# Patient Record
Sex: FEMALE | Race: BLACK OR AFRICAN AMERICAN | Employment: UNEMPLOYED
[De-identification: names, ages, dates, MRNs, and addresses within clinical notes are randomized per-mention and may not be internally consistent; named-entity substitution may affect disease eponyms.]

---

## 2017-01-23 ENCOUNTER — TELEPHONE (OUTPATIENT)
Dept: CASE MANAGEMENT | Age: 77
End: 2017-01-23

## 2017-01-23 ENCOUNTER — OFFICE VISIT (OUTPATIENT)
Dept: INTERNAL MEDICINE CLINIC | Age: 77
End: 2017-01-23

## 2017-01-23 VITALS
WEIGHT: 97.2 LBS | SYSTOLIC BLOOD PRESSURE: 120 MMHG | BODY MASS INDEX: 17.22 KG/M2 | DIASTOLIC BLOOD PRESSURE: 56 MMHG | HEART RATE: 91 BPM | TEMPERATURE: 97.4 F | OXYGEN SATURATION: 94 %

## 2017-01-23 DIAGNOSIS — J18.9 PNEUMONIA OF LEFT LOWER LOBE DUE TO INFECTIOUS ORGANISM: Primary | ICD-10-CM

## 2017-01-23 DIAGNOSIS — E11.9 CONTROLLED TYPE 2 DIABETES MELLITUS WITHOUT COMPLICATION, WITHOUT LONG-TERM CURRENT USE OF INSULIN (HCC): ICD-10-CM

## 2017-01-23 DIAGNOSIS — R07.81 RIB PAIN ON LEFT SIDE: ICD-10-CM

## 2017-01-23 PROCEDURE — 99213 OFFICE O/P EST LOW 20 MIN: CPT | Performed by: NURSE PRACTITIONER

## 2017-01-23 RX ORDER — IBUPROFEN 400 MG/1
400 TABLET ORAL EVERY 6 HOURS PRN
Qty: 90 TABLET | Refills: 3 | Status: SHIPPED | OUTPATIENT
Start: 2017-01-23 | End: 2017-03-06 | Stop reason: SDUPTHER

## 2017-02-06 ENCOUNTER — OFFICE VISIT (OUTPATIENT)
Dept: PULMONOLOGY | Age: 77
End: 2017-02-06

## 2017-02-06 VITALS
OXYGEN SATURATION: 91 % | HEART RATE: 107 BPM | HEIGHT: 63 IN | RESPIRATION RATE: 18 BRPM | DIASTOLIC BLOOD PRESSURE: 76 MMHG | WEIGHT: 96 LBS | SYSTOLIC BLOOD PRESSURE: 132 MMHG | TEMPERATURE: 96.7 F | BODY MASS INDEX: 17.01 KG/M2

## 2017-02-06 DIAGNOSIS — J45.30 MILD PERSISTENT ASTHMA WITHOUT COMPLICATION: ICD-10-CM

## 2017-02-06 DIAGNOSIS — R93.89 ABNORMAL CT SCAN, CHEST: ICD-10-CM

## 2017-02-06 DIAGNOSIS — D80.1 HYPOGAMMAGLOBULINAEMIA, UNSPECIFIED: Primary | ICD-10-CM

## 2017-02-06 DIAGNOSIS — Z09 HOSPITAL DISCHARGE FOLLOW-UP: ICD-10-CM

## 2017-02-06 PROCEDURE — 99214 OFFICE O/P EST MOD 30 MIN: CPT | Performed by: INTERNAL MEDICINE

## 2017-02-06 RX ORDER — ALBUTEROL SULFATE 90 UG/1
2 AEROSOL, METERED RESPIRATORY (INHALATION) EVERY 6 HOURS PRN
Qty: 1 INHALER | Refills: 1 | Status: SHIPPED | OUTPATIENT
Start: 2017-02-06 | End: 2017-03-06 | Stop reason: SDUPTHER

## 2017-02-14 ENCOUNTER — HOSPITAL ENCOUNTER (OUTPATIENT)
Dept: CT IMAGING | Age: 77
Discharge: HOME | End: 2017-02-14
Attending: INTERNAL MEDICINE | Admitting: INTERNAL MEDICINE

## 2017-02-14 VITALS — HEART RATE: 97 BPM | OXYGEN SATURATION: 97 %

## 2017-02-14 DIAGNOSIS — R93.89 ABNORMAL RADIOLOGICAL FINDINGS IN SKIN AND SUBCUTANEOUS TISSUE: ICD-10-CM

## 2017-02-14 DIAGNOSIS — J45.30 MILD PERSISTENT ASTHMA, UNCOMPLICATED: ICD-10-CM

## 2017-02-14 RX ORDER — ALBUTEROL SULFATE 90 UG/1
4 AEROSOL, METERED RESPIRATORY (INHALATION) ONCE
Status: COMPLETED | OUTPATIENT
Start: 2017-02-14 | End: 2017-02-14

## 2017-02-14 RX ADMIN — ALBUTEROL SULFATE 4 PUFF: 90 AEROSOL, METERED RESPIRATORY (INHALATION) at 15:54

## 2017-02-23 PROBLEM — B99.9 RECURRENT INFECTIONS: Status: ACTIVE | Noted: 2017-02-23

## 2017-03-06 ENCOUNTER — OFFICE VISIT (OUTPATIENT)
Dept: INTERNAL MEDICINE CLINIC | Age: 77
End: 2017-03-06

## 2017-03-06 VITALS
SYSTOLIC BLOOD PRESSURE: 130 MMHG | WEIGHT: 103 LBS | HEART RATE: 86 BPM | OXYGEN SATURATION: 94 % | BODY MASS INDEX: 18.25 KG/M2 | DIASTOLIC BLOOD PRESSURE: 68 MMHG

## 2017-03-06 DIAGNOSIS — J44.9 CHRONIC OBSTRUCTIVE PULMONARY DISEASE, UNSPECIFIED COPD TYPE (HCC): ICD-10-CM

## 2017-03-06 DIAGNOSIS — Z79.4 CONTROLLED TYPE 2 DIABETES MELLITUS WITHOUT COMPLICATION, WITH LONG-TERM CURRENT USE OF INSULIN (HCC): Primary | ICD-10-CM

## 2017-03-06 DIAGNOSIS — M72.0 PALMAR FASCIITIS: ICD-10-CM

## 2017-03-06 DIAGNOSIS — E11.9 CONTROLLED TYPE 2 DIABETES MELLITUS WITHOUT COMPLICATION, WITH LONG-TERM CURRENT USE OF INSULIN (HCC): Primary | ICD-10-CM

## 2017-03-06 PROCEDURE — 99214 OFFICE O/P EST MOD 30 MIN: CPT | Performed by: NURSE PRACTITIONER

## 2017-03-06 RX ORDER — ALBUTEROL SULFATE 90 UG/1
2 AEROSOL, METERED RESPIRATORY (INHALATION) EVERY 6 HOURS PRN
Qty: 1 INHALER | Refills: 1 | Status: SHIPPED | OUTPATIENT
Start: 2017-03-06

## 2017-03-06 RX ORDER — IBUPROFEN 400 MG/1
TABLET ORAL
Qty: 90 TABLET | Refills: 3 | Status: ON HOLD | OUTPATIENT
Start: 2017-03-06 | End: 2019-08-14 | Stop reason: HOSPADM

## 2017-03-09 PROBLEM — Z29.8 PROPHYLACTIC IMMUNOTHERAPY: Status: ACTIVE | Noted: 2017-03-09

## 2017-03-31 ENCOUNTER — TELEPHONE (OUTPATIENT)
Dept: INTERNAL MEDICINE CLINIC | Age: 77
End: 2017-03-31

## 2017-06-14 ENCOUNTER — OFFICE VISIT (OUTPATIENT)
Dept: INTERNAL MEDICINE CLINIC | Age: 77
End: 2017-06-14

## 2017-06-14 VITALS
WEIGHT: 110 LBS | OXYGEN SATURATION: 98 % | DIASTOLIC BLOOD PRESSURE: 80 MMHG | SYSTOLIC BLOOD PRESSURE: 160 MMHG | BODY MASS INDEX: 20.12 KG/M2 | HEART RATE: 84 BPM

## 2017-06-14 DIAGNOSIS — E78.5 HYPERLIPIDEMIA, UNSPECIFIED HYPERLIPIDEMIA TYPE: ICD-10-CM

## 2017-06-14 DIAGNOSIS — E11.9 TYPE 2 DIABETES MELLITUS WITHOUT COMPLICATION, WITH LONG-TERM CURRENT USE OF INSULIN (HCC): Primary | Chronic | ICD-10-CM

## 2017-06-14 DIAGNOSIS — Z79.4 TYPE 2 DIABETES MELLITUS WITHOUT COMPLICATION, WITH LONG-TERM CURRENT USE OF INSULIN (HCC): Primary | Chronic | ICD-10-CM

## 2017-06-14 DIAGNOSIS — I10 ESSENTIAL HYPERTENSION: ICD-10-CM

## 2017-06-14 PROCEDURE — 99213 OFFICE O/P EST LOW 20 MIN: CPT | Performed by: NURSE PRACTITIONER

## 2017-06-14 RX ORDER — PRAVASTATIN SODIUM 40 MG
40 TABLET ORAL DAILY
Qty: 30 TABLET | Refills: 3 | Status: SHIPPED | OUTPATIENT
Start: 2017-06-14 | End: 2018-06-07 | Stop reason: SDUPTHER

## 2017-06-14 RX ORDER — CHLORTHALIDONE 25 MG/1
TABLET ORAL
Qty: 30 TABLET | Refills: 1 | Status: SHIPPED | OUTPATIENT
Start: 2017-06-14 | End: 2018-11-29 | Stop reason: SDUPTHER

## 2017-06-21 ENCOUNTER — TELEPHONE (OUTPATIENT)
Dept: INTERNAL MEDICINE CLINIC | Age: 77
End: 2017-06-21

## 2017-07-27 ENCOUNTER — OFFICE VISIT (OUTPATIENT)
Dept: INTERNAL MEDICINE CLINIC | Age: 77
End: 2017-07-27

## 2017-07-27 VITALS
DIASTOLIC BLOOD PRESSURE: 60 MMHG | BODY MASS INDEX: 20.85 KG/M2 | OXYGEN SATURATION: 96 % | WEIGHT: 114 LBS | HEART RATE: 101 BPM | SYSTOLIC BLOOD PRESSURE: 130 MMHG

## 2017-07-27 DIAGNOSIS — E11.9 TYPE 2 DIABETES MELLITUS WITHOUT COMPLICATION, WITH LONG-TERM CURRENT USE OF INSULIN (HCC): Primary | ICD-10-CM

## 2017-07-27 DIAGNOSIS — Z79.4 TYPE 2 DIABETES MELLITUS WITHOUT COMPLICATION, WITH LONG-TERM CURRENT USE OF INSULIN (HCC): Primary | ICD-10-CM

## 2017-07-27 PROCEDURE — 99213 OFFICE O/P EST LOW 20 MIN: CPT | Performed by: NURSE PRACTITIONER

## 2017-07-27 ASSESSMENT — PATIENT HEALTH QUESTIONNAIRE - PHQ9
SUM OF ALL RESPONSES TO PHQ9 QUESTIONS 1 & 2: 0
2. FEELING DOWN, DEPRESSED OR HOPELESS: 0
SUM OF ALL RESPONSES TO PHQ QUESTIONS 1-9: 0
1. LITTLE INTEREST OR PLEASURE IN DOING THINGS: 0

## 2017-08-03 ENCOUNTER — TELEPHONE (OUTPATIENT)
Dept: INTERNAL MEDICINE CLINIC | Age: 77
End: 2017-08-03

## 2017-08-31 ENCOUNTER — OFFICE VISIT (OUTPATIENT)
Dept: INTERNAL MEDICINE CLINIC | Age: 77
End: 2017-08-31

## 2017-08-31 VITALS
HEART RATE: 108 BPM | WEIGHT: 115 LBS | OXYGEN SATURATION: 93 % | DIASTOLIC BLOOD PRESSURE: 58 MMHG | SYSTOLIC BLOOD PRESSURE: 114 MMHG | BODY MASS INDEX: 21.16 KG/M2

## 2017-08-31 DIAGNOSIS — Z79.4 TYPE 2 DIABETES MELLITUS WITHOUT COMPLICATION, WITH LONG-TERM CURRENT USE OF INSULIN (HCC): Primary | ICD-10-CM

## 2017-08-31 DIAGNOSIS — E11.9 TYPE 2 DIABETES MELLITUS WITHOUT COMPLICATION, WITH LONG-TERM CURRENT USE OF INSULIN (HCC): Primary | ICD-10-CM

## 2017-08-31 PROCEDURE — 99213 OFFICE O/P EST LOW 20 MIN: CPT | Performed by: NURSE PRACTITIONER

## 2017-08-31 ASSESSMENT — ENCOUNTER SYMPTOMS: COUGH: 0

## 2017-11-06 ENCOUNTER — CARE COORDINATION (OUTPATIENT)
Dept: CARE COORDINATION | Age: 77
End: 2017-11-06

## 2017-11-13 ENCOUNTER — CARE COORDINATION (OUTPATIENT)
Dept: CARE COORDINATION | Age: 77
End: 2017-11-13

## 2017-11-29 ENCOUNTER — CARE COORDINATION (OUTPATIENT)
Dept: CARE COORDINATION | Age: 77
End: 2017-11-29

## 2017-12-07 ENCOUNTER — OFFICE VISIT (OUTPATIENT)
Dept: INTERNAL MEDICINE CLINIC | Age: 77
End: 2017-12-07

## 2017-12-07 VITALS — BODY MASS INDEX: 21.71 KG/M2 | SYSTOLIC BLOOD PRESSURE: 110 MMHG | WEIGHT: 118 LBS | DIASTOLIC BLOOD PRESSURE: 64 MMHG

## 2017-12-07 DIAGNOSIS — N95.9 MENOPAUSAL AND PERIMENOPAUSAL DISORDER: ICD-10-CM

## 2017-12-07 DIAGNOSIS — E11.9 DIABETES MELLITUS TYPE 2 IN NONOBESE (HCC): Primary | ICD-10-CM

## 2017-12-07 DIAGNOSIS — Z00.00 HEALTHCARE MAINTENANCE: ICD-10-CM

## 2017-12-07 PROCEDURE — 99213 OFFICE O/P EST LOW 20 MIN: CPT | Performed by: NURSE PRACTITIONER

## 2018-05-10 ENCOUNTER — TELEPHONE (OUTPATIENT)
Dept: INTERNAL MEDICINE CLINIC | Age: 78
End: 2018-05-10

## 2018-05-10 ENCOUNTER — CARE COORDINATION (OUTPATIENT)
Dept: CARE COORDINATION | Age: 78
End: 2018-05-10

## 2018-05-17 ENCOUNTER — CARE COORDINATION (OUTPATIENT)
Dept: CARE COORDINATION | Age: 78
End: 2018-05-17

## 2018-05-24 ENCOUNTER — CARE COORDINATION (OUTPATIENT)
Dept: INTERNAL MEDICINE CLINIC | Age: 78
End: 2018-05-24

## 2018-06-07 ENCOUNTER — OFFICE VISIT (OUTPATIENT)
Dept: INTERNAL MEDICINE CLINIC | Age: 78
End: 2018-06-07

## 2018-06-07 VITALS
BODY MASS INDEX: 21.73 KG/M2 | SYSTOLIC BLOOD PRESSURE: 120 MMHG | OXYGEN SATURATION: 95 % | HEART RATE: 110 BPM | WEIGHT: 115 LBS | DIASTOLIC BLOOD PRESSURE: 60 MMHG

## 2018-06-07 DIAGNOSIS — Z23 NEED FOR PNEUMOCOCCAL VACCINE: ICD-10-CM

## 2018-06-07 DIAGNOSIS — E78.5 HYPERLIPIDEMIA, UNSPECIFIED HYPERLIPIDEMIA TYPE: ICD-10-CM

## 2018-06-07 LAB — HBA1C MFR BLD: 14 %

## 2018-06-07 PROCEDURE — G0009 ADMIN PNEUMOCOCCAL VACCINE: HCPCS | Performed by: NURSE PRACTITIONER

## 2018-06-07 PROCEDURE — 90670 PCV13 VACCINE IM: CPT | Performed by: NURSE PRACTITIONER

## 2018-06-07 PROCEDURE — 83036 HEMOGLOBIN GLYCOSYLATED A1C: CPT | Performed by: NURSE PRACTITIONER

## 2018-06-07 PROCEDURE — 99214 OFFICE O/P EST MOD 30 MIN: CPT | Performed by: NURSE PRACTITIONER

## 2018-06-07 RX ORDER — PRAVASTATIN SODIUM 40 MG
40 TABLET ORAL DAILY
Qty: 30 TABLET | Refills: 3 | Status: SHIPPED | OUTPATIENT
Start: 2018-06-07 | End: 2018-11-29 | Stop reason: SDUPTHER

## 2018-06-07 ASSESSMENT — ENCOUNTER SYMPTOMS: SHORTNESS OF BREATH: 1

## 2018-07-12 ENCOUNTER — OFFICE VISIT (OUTPATIENT)
Dept: INTERNAL MEDICINE CLINIC | Age: 78
End: 2018-07-12

## 2018-07-12 VITALS
HEART RATE: 89 BPM | SYSTOLIC BLOOD PRESSURE: 128 MMHG | BODY MASS INDEX: 21.54 KG/M2 | WEIGHT: 114 LBS | OXYGEN SATURATION: 98 % | DIASTOLIC BLOOD PRESSURE: 80 MMHG

## 2018-07-12 PROCEDURE — 99213 OFFICE O/P EST LOW 20 MIN: CPT | Performed by: NURSE PRACTITIONER

## 2018-08-28 ENCOUNTER — TELEPHONE (OUTPATIENT)
Dept: INTERNAL MEDICINE CLINIC | Age: 78
End: 2018-08-28

## 2018-09-06 ENCOUNTER — OFFICE VISIT (OUTPATIENT)
Dept: INTERNAL MEDICINE CLINIC | Age: 78
End: 2018-09-06

## 2018-09-06 VITALS
WEIGHT: 117 LBS | BODY MASS INDEX: 22.11 KG/M2 | TEMPERATURE: 98 F | DIASTOLIC BLOOD PRESSURE: 58 MMHG | OXYGEN SATURATION: 97 % | SYSTOLIC BLOOD PRESSURE: 98 MMHG | HEART RATE: 97 BPM

## 2018-09-06 DIAGNOSIS — E11.8 TYPE 2 DIABETES MELLITUS WITH COMPLICATION, WITHOUT LONG-TERM CURRENT USE OF INSULIN (HCC): Primary | ICD-10-CM

## 2018-09-06 LAB — HBA1C MFR BLD: 13.7 %

## 2018-09-06 PROCEDURE — 83036 HEMOGLOBIN GLYCOSYLATED A1C: CPT | Performed by: NURSE PRACTITIONER

## 2018-09-06 PROCEDURE — 99213 OFFICE O/P EST LOW 20 MIN: CPT | Performed by: NURSE PRACTITIONER

## 2018-09-06 RX ORDER — PEN NEEDLE, DIABETIC 30 GX5/16"
1 NEEDLE, DISPOSABLE MISCELLANEOUS DAILY
Qty: 100 EACH | Refills: 3 | Status: SHIPPED | OUTPATIENT
Start: 2018-09-06

## 2018-09-06 ASSESSMENT — PATIENT HEALTH QUESTIONNAIRE - PHQ9
1. LITTLE INTEREST OR PLEASURE IN DOING THINGS: 0
2. FEELING DOWN, DEPRESSED OR HOPELESS: 0
SUM OF ALL RESPONSES TO PHQ QUESTIONS 1-9: 0
SUM OF ALL RESPONSES TO PHQ QUESTIONS 1-9: 0
SUM OF ALL RESPONSES TO PHQ9 QUESTIONS 1 & 2: 0

## 2018-09-06 ASSESSMENT — ENCOUNTER SYMPTOMS: NAUSEA: 0

## 2018-09-06 NOTE — PROGRESS NOTES
Subjective:      Patient ID: Inessa Ferrara is a 66 y.o. female. Diabetes   She presents for her follow-up diabetic visit. She has type 2 diabetes mellitus. No MedicAlert identification noted. Her disease course has been improving. There are no hypoglycemic associated symptoms. Associated symptoms include fatigue. There are no hypoglycemic complications. Symptoms are improving. There are no diabetic complications. Risk factors for coronary artery disease include family history. Current diabetic treatment includes insulin injections and oral agent (monotherapy). She is compliant with treatment most of the time. Her breakfast blood glucose range is generally 130-140 mg/dl. Her dinner blood glucose range is generally 70-90 mg/dl. Fatigue   Associated symptoms include fatigue. Pertinent negatives include no nausea. Review of Systems   Constitutional: Positive for appetite change (decreased) and fatigue. Gastrointestinal: Negative for nausea. All other systems reviewed and are negative. Wt Readings from Last 3 Encounters:   09/06/18 117 lb (53.1 kg)   07/12/18 114 lb (51.7 kg)   06/07/18 115 lb (52.2 kg)     BP Readings from Last 3 Encounters:   09/06/18 (!) 98/58   07/12/18 128/80   06/07/18 120/60     Past Medical History:   Diagnosis Date    CLL (chronic lymphocytic leukemia) (HCC)     COPD, severe (HCC)     Diabetes type 2, uncontrolled ([facility] [address]     Disease of blood and blood forming organ     H/O medication noncompliance     Does not take her insulin regularly    Heart murmur     Hyperlipidemia     Hypertension     Influenza A 2/13/16    Other disorders of kidney and ureter in diseases classified elsewhere     Pneumonia      Objective:   Physical Exam   Constitutional: She is oriented to person, place, and time. She appears well-developed and well-nourished. Cardiovascular: Normal rate and regular rhythm.     Pulmonary/Chest: Effort normal and breath sounds normal.   Neurological: She is alert and oriented to person, place, and time. Skin: Skin is warm and dry. Psychiatric: She has a normal mood and affect. Her speech is normal.     Diabetes Mellitus Type 2: Current symptoms/problems include none. Medication compliance:  compliant most of the time  Diabetic diet compliance:  noncompliant: Enjoys her peanut butter Zachary cup,  Weight trend: stable  Current exercise: no regular exercise  Barriers: lack of motivation and overwhelmed by complexity of regimen    Home blood sugar records: Tested erratically  Any episodes of hypoglycemia? no  Eye exam current (within one year): no   reports that she quit smoking about 31 years ago. Her smoking use included Cigarettes. She has a 6.00 pack-year smoking history. She has never used smokeless tobacco.   Daily Aspirin?  Yes    Lab Results   Component Value Date    LABA1C 13.7 09/06/2018    LABA1C 14 06/07/2018    LABA1C 8.0 12/23/2016     Lab Results   Component Value Date    LABMICR Not Indicated 01/03/2017    CREATININE 1.5 (H) 03/19/2018     Lab Results   Component Value Date    ALT 12 03/19/2018    AST 21 03/19/2018     Lab Results   Component Value Date    CHOL 182 12/22/2016    TRIG 82 12/22/2016    HDL 70 (H) 12/22/2016    LDLCALC 96 12/22/2016        Assessment:      Diabetes type 2: Uncontrolled      Plan:     Increase water to 3.5 glasses a day  Decrease going barefoot in yard  Discontinue Humalog  Please start new medication today and then nightly please check glucose daily          Nancy Ferrell, APRN - CNP

## 2018-09-20 ENCOUNTER — OFFICE VISIT (OUTPATIENT)
Dept: INTERNAL MEDICINE CLINIC | Age: 78
End: 2018-09-20

## 2018-09-20 VITALS
WEIGHT: 120 LBS | HEART RATE: 88 BPM | SYSTOLIC BLOOD PRESSURE: 126 MMHG | DIASTOLIC BLOOD PRESSURE: 82 MMHG | OXYGEN SATURATION: 98 % | BODY MASS INDEX: 22.67 KG/M2

## 2018-09-20 DIAGNOSIS — E11.8 TYPE 2 DIABETES MELLITUS WITH COMPLICATION, WITHOUT LONG-TERM CURRENT USE OF INSULIN (HCC): Primary | ICD-10-CM

## 2018-09-20 PROCEDURE — 99213 OFFICE O/P EST LOW 20 MIN: CPT | Performed by: NURSE PRACTITIONER

## 2018-09-20 ASSESSMENT — ENCOUNTER SYMPTOMS
SHORTNESS OF BREATH: 0
VISUAL CHANGE: 0
CHEST TIGHTNESS: 0
RESPIRATORY NEGATIVE: 1
ALLERGIC/IMMUNOLOGIC NEGATIVE: 1
BLURRED VISION: 0
EYES NEGATIVE: 1

## 2018-09-20 NOTE — PROGRESS NOTES
Subjective:      Patient ID: Inessa Ferrara is a 66 y.o. female. Chief Complaint   Patient presents with    Diabetes     F/U-Diabetes     Diabetes   She presents for her follow-up diabetic visit. She has type 2 diabetes mellitus. No MedicAlert identification noted. Her disease course has been improving. There are no hypoglycemic associated symptoms. Pertinent negatives for hypoglycemia include no dizziness, headaches or nervousness/anxiousness. Pertinent negatives for diabetes include no blurred vision, no chest pain, no fatigue, no foot paresthesias, no foot ulcerations, no polydipsia, no polyphagia, no polyuria, no visual change, no weakness and no weight loss. There are no hypoglycemic complications. Symptoms are stable. Pertinent negatives for diabetic complications include no nephropathy or retinopathy. (Tingling in fingers on occasion.) Risk factors for coronary artery disease include post-menopausal, diabetes mellitus and family history. Current diabetic treatment includes insulin injections and oral agent (monotherapy). She is compliant with treatment all of the time. Her weight is stable. She is following a generally healthy (\"Normal diet\") diet. Diabetic meal planning: Occasionally gets candy. She has not had a previous visit with a dietitian. Exercise: Plays with grandchildre. No exercise otherwise. She monitors blood glucose at home 1-2 x per day. Blood glucose monitoring compliance is excellent. Home blood sugar record trend: Home readings range between 140-170mg/dL. An ACE inhibitor/angiotensin II receptor blocker is being taken. She does not see a podiatrist.Eye exam is not current (Had eye exam last year). Smoking History: not a current smoker. Quit smoking 31 years ago. Patient states she has never had a foot exam. Does not do self foot exams at home. No other concerns at this time.      Review of Systems   Constitutional: Negative for activity change, appetite change, fatigue and weight

## 2018-09-20 NOTE — PATIENT INSTRUCTIONS
Continue taking medication as prescribed  Remember to watch her diet any food portions  Do not go barefoot  Continue to drink water

## 2018-10-11 ENCOUNTER — OFFICE VISIT (OUTPATIENT)
Dept: INTERNAL MEDICINE CLINIC | Age: 78
End: 2018-10-11
Payer: MEDICARE

## 2018-10-11 VITALS
HEART RATE: 92 BPM | OXYGEN SATURATION: 95 % | WEIGHT: 123 LBS | DIASTOLIC BLOOD PRESSURE: 70 MMHG | SYSTOLIC BLOOD PRESSURE: 144 MMHG | BODY MASS INDEX: 23.24 KG/M2

## 2018-10-11 DIAGNOSIS — Z00.00 HEALTHCARE MAINTENANCE: Primary | ICD-10-CM

## 2018-10-11 DIAGNOSIS — E11.8 TYPE 2 DIABETES MELLITUS WITH COMPLICATION, WITHOUT LONG-TERM CURRENT USE OF INSULIN (HCC): ICD-10-CM

## 2018-10-11 DIAGNOSIS — Z78.0 ASYMPTOMATIC MENOPAUSAL STATE: ICD-10-CM

## 2018-10-11 PROCEDURE — 99213 OFFICE O/P EST LOW 20 MIN: CPT | Performed by: NURSE PRACTITIONER

## 2018-10-11 RX ORDER — PEN NEEDLE, DIABETIC 31 G X1/4"
1 NEEDLE, DISPOSABLE MISCELLANEOUS DAILY
Qty: 100 EACH | Refills: 2 | Status: SHIPPED | OUTPATIENT
Start: 2018-10-11

## 2018-10-11 RX ORDER — ERGOCALCIFEROL (VITAMIN D2) 1250 MCG
50000 CAPSULE ORAL WEEKLY
Qty: 30 CAPSULE | Refills: 0 | Status: SHIPPED | OUTPATIENT
Start: 2018-10-11 | End: 2018-11-29 | Stop reason: SDUPTHER

## 2018-10-11 NOTE — PROGRESS NOTES
Subjective:      Patient ID: Charmayne Silva is a 66 y.o. female. Diabetes   She presents for her follow-up diabetic visit. She has type 2 diabetes mellitus. No MedicAlert identification noted. Her disease course has been fluctuating. There are no hypoglycemic associated symptoms.        Review of Systems  BP Readings from Last 3 Encounters:   10/11/18 (!) 140/70   09/20/18 126/82   09/06/18 (!) 98/58     Wt Readings from Last 3 Encounters:   10/11/18 123 lb (55.8 kg)   09/20/18 120 lb (54.4 kg)   09/06/18 117 lb (53.1 kg)     Past Medical History:   Diagnosis Date    CLL (chronic lymphocytic leukemia) (HCC)     [facility] [address]     Diabetes type 2, uncontrolled ([facility] [address]     Disease of blood and blood forming organ     H/O medication noncompliance     Does not take her insulin regularly    Heart murmur     Hyperlipidemia     Hypertension     Influenza A 2/13/16    Other disorders of kidney and ureter in diseases classified elsewhere     Pneumonia      Current Outpatient Prescriptions on File Prior to Visit   Medication Sig Dispense Refill    Insulin Pen Needle (PEN NEEDLES 5/16\") 30G X 8 MM MISC 1 each by Does not apply route daily 100 each 3    pravastatin (PRAVACHOL) 40 MG tablet Take 1 tablet by mouth daily 30 tablet 3    linagliptin (TRADJENTA) 5 MG tablet Take 1 tablet by mouth daily 42 tablet 0    chlorthalidone (HYGROTON) 25 MG tablet One half tablet daily 30 tablet 1    ibuprofen (ADVIL;MOTRIN) 400 MG tablet One tab every 6 hours as needed 90 tablet 3    albuterol sulfate  (90 BASE) MCG/ACT inhaler Inhale 2 puffs into the lungs every 6 hours as needed for Wheezing 1 Inhaler 1    mometasone-formoterol (DULERA) 200-5 MCG/ACT inhaler Inhale 2 puffs into the lungs 2 times daily 1 Inhaler 2    Lancet Devices (EASY MINI EJECT LANCING DEVICE) MISC       sitaGLIPtin (JANUVIA) 50 MG tablet Take 1 tablet by mouth daily 30 tablet 1    calcium-vitamin D (OSCAL-500) 500-200 MG-UNIT per tablet

## 2018-10-15 ENCOUNTER — TELEPHONE (OUTPATIENT)
Dept: INTERNAL MEDICINE CLINIC | Age: 78
End: 2018-10-15

## 2018-10-16 ENCOUNTER — HOSPITAL ENCOUNTER (OUTPATIENT)
Dept: GENERAL RADIOLOGY | Age: 78
Discharge: HOME | End: 2018-10-16
Payer: MEDICARE

## 2018-10-16 DIAGNOSIS — Z00.00 HEALTHCARE MAINTENANCE: ICD-10-CM

## 2018-10-16 DIAGNOSIS — Z78.0 ASYMPTOMATIC MENOPAUSAL STATE: ICD-10-CM

## 2018-10-16 PROCEDURE — 77080 DXA BONE DENSITY AXIAL: CPT

## 2018-10-24 ENCOUNTER — TELEPHONE (OUTPATIENT)
Dept: PULMONOLOGY | Age: 78
End: 2018-10-24

## 2018-11-29 ENCOUNTER — OFFICE VISIT (OUTPATIENT)
Dept: INTERNAL MEDICINE CLINIC | Age: 78
End: 2018-11-29
Payer: MEDICARE

## 2018-11-29 VITALS
WEIGHT: 128 LBS | BODY MASS INDEX: 24.19 KG/M2 | DIASTOLIC BLOOD PRESSURE: 60 MMHG | SYSTOLIC BLOOD PRESSURE: 140 MMHG | OXYGEN SATURATION: 95 % | HEART RATE: 89 BPM

## 2018-11-29 DIAGNOSIS — E78.5 HYPERLIPIDEMIA, UNSPECIFIED HYPERLIPIDEMIA TYPE: ICD-10-CM

## 2018-11-29 DIAGNOSIS — Z23 FLU VACCINE NEED: ICD-10-CM

## 2018-11-29 DIAGNOSIS — E11.65 UNCONTROLLED TYPE 2 DIABETES MELLITUS WITH HYPERGLYCEMIA (HCC): Primary | ICD-10-CM

## 2018-11-29 DIAGNOSIS — I10 ESSENTIAL HYPERTENSION: ICD-10-CM

## 2018-11-29 LAB — GLUCOSE BLD-MCNC: 121 MG/DL

## 2018-11-29 PROCEDURE — G0008 ADMIN INFLUENZA VIRUS VAC: HCPCS | Performed by: NURSE PRACTITIONER

## 2018-11-29 PROCEDURE — 82962 GLUCOSE BLOOD TEST: CPT | Performed by: NURSE PRACTITIONER

## 2018-11-29 PROCEDURE — 99213 OFFICE O/P EST LOW 20 MIN: CPT | Performed by: NURSE PRACTITIONER

## 2018-11-29 PROCEDURE — 90662 IIV NO PRSV INCREASED AG IM: CPT | Performed by: NURSE PRACTITIONER

## 2018-11-29 RX ORDER — ERGOCALCIFEROL (VITAMIN D2) 1250 MCG
50000 CAPSULE ORAL WEEKLY
Qty: 30 CAPSULE | Refills: 0 | Status: SHIPPED | OUTPATIENT
Start: 2018-11-29

## 2018-11-29 RX ORDER — CHLORTHALIDONE 25 MG/1
TABLET ORAL
Qty: 30 TABLET | Refills: 1 | Status: ON HOLD | OUTPATIENT
Start: 2018-11-29 | End: 2019-08-14 | Stop reason: HOSPADM

## 2018-11-29 RX ORDER — PRAVASTATIN SODIUM 40 MG
40 TABLET ORAL DAILY
Qty: 30 TABLET | Refills: 3 | Status: SHIPPED | OUTPATIENT
Start: 2018-11-29

## 2018-11-29 NOTE — PROGRESS NOTES
Subjective:      Patient ID: Bari Yan is a 66 y.o. female. Presents today for follow up on diabetes. States she is eating better, is feeling better, and apparently had one episode of hypoglycemia when she fell to eat breakfast.        Review of Systems  Wt Readings from Last 3 Encounters:   11/29/18 128 lb (58.1 kg)   10/11/18 123 lb (55.8 kg)   09/20/18 120 lb (54.4 kg)     BP Readings from Last 3 Encounters:   11/29/18 (!) 140/60   10/11/18 (!) 144/70   09/20/18 126/82     Diabetes Mellitus Type 2: Current symptoms/problems include none. Medication compliance:  compliant all of the time  Diabetic diet compliance:  compliant most of the time,  Weight trend: fluctuating  Current exercise: no regular exercise  Barriers: none    Home blood sugar records: postprandial range:   Any episodes of hypoglycemia? yes - once  Eye exam current (within one year): unknown   reports that she quit smoking about 31 years ago. Her smoking use included Cigarettes. She has a 6.00 pack-year smoking history. She has never used smokeless tobacco.   Daily Aspirin? Yes    Lab Results   Component Value Date    LABA1C 13.7 09/06/2018    LABA1C 14 06/07/2018    LABA1C 8.0 12/23/2016     Lab Results   Component Value Date    LABMICR Not Indicated 01/03/2017    CREATININE 1.5 (H) 03/19/2018     Lab Results   Component Value Date    ALT 12 03/19/2018    AST 21 03/19/2018     Lab Results   Component Value Date    CHOL 182 12/22/2016    TRIG 82 12/22/2016    HDL 70 (H) 12/22/2016    LDLCALC 96 12/22/2016        Objective:   Physical Exam   Constitutional: She is oriented to person, place, and time. She appears well-developed and well-nourished. Neck: Normal range of motion. Cardiovascular: Normal rate, regular rhythm and normal heart sounds. Pulmonary/Chest: Effort normal and breath sounds normal.   Neurological: She is alert and oriented to person, place, and time. Skin: Skin is warm and dry.    Psychiatric: She has a normal

## 2018-11-29 NOTE — PATIENT INSTRUCTIONS
Keep peanut butter handy for low blood sugars  Eat small fruit at fruti at night if you get hungry  Continue to drink plenty of water

## 2019-01-29 ENCOUNTER — OFFICE VISIT (OUTPATIENT)
Dept: INTERNAL MEDICINE CLINIC | Age: 79
End: 2019-01-29
Payer: MEDICARE

## 2019-01-29 VITALS — DIASTOLIC BLOOD PRESSURE: 68 MMHG | WEIGHT: 127 LBS | BODY MASS INDEX: 24 KG/M2 | SYSTOLIC BLOOD PRESSURE: 136 MMHG

## 2019-01-29 DIAGNOSIS — E11.9 TYPE 2 DIABETES MELLITUS WITHOUT COMPLICATION, WITH LONG-TERM CURRENT USE OF INSULIN (HCC): Primary | ICD-10-CM

## 2019-01-29 DIAGNOSIS — Z79.4 TYPE 2 DIABETES MELLITUS WITHOUT COMPLICATION, WITH LONG-TERM CURRENT USE OF INSULIN (HCC): Primary | ICD-10-CM

## 2019-01-29 LAB
ESTIMATED AVERAGE GLUCOSE: 283.4 MG/DL
HBA1C MFR BLD: 11.5 %

## 2019-01-29 PROCEDURE — 99213 OFFICE O/P EST LOW 20 MIN: CPT | Performed by: NURSE PRACTITIONER

## 2019-01-29 ASSESSMENT — ENCOUNTER SYMPTOMS
ALLERGIC/IMMUNOLOGIC NEGATIVE: 1
RESPIRATORY NEGATIVE: 1
EYES NEGATIVE: 1

## 2019-03-17 ENCOUNTER — APPOINTMENT (OUTPATIENT)
Dept: GENERAL RADIOLOGY | Age: 79
End: 2019-03-17
Payer: MEDICARE

## 2019-03-17 ENCOUNTER — HOSPITAL ENCOUNTER (EMERGENCY)
Age: 79
Discharge: HOME | End: 2019-03-18
Attending: EMERGENCY MEDICINE
Payer: MEDICARE

## 2019-03-17 DIAGNOSIS — N39.0 URINARY TRACT INFECTION IN FEMALE: Primary | ICD-10-CM

## 2019-03-17 DIAGNOSIS — Z85.6 PERSONAL HISTORY OF CLL (CHRONIC LYMPHOCYTIC LEUKEMIA): ICD-10-CM

## 2019-03-17 LAB
A/G RATIO: 1.1 (ref 1.1–2.2)
ALBUMIN SERPL-MCNC: 4 G/DL (ref 3.4–5)
ALP BLD-CCNC: 131 U/L (ref 40–129)
ALT SERPL-CCNC: 8 U/L (ref 10–40)
ANION GAP SERPL CALCULATED.3IONS-SCNC: 16 MMOL/L (ref 3–16)
AST SERPL-CCNC: 9 U/L (ref 15–37)
BACTERIA: ABNORMAL /HPF
BASOPHILS ABSOLUTE: 0 K/UL (ref 0–0.2)
BASOPHILS RELATIVE PERCENT: 0.3 %
BILIRUB SERPL-MCNC: 0.4 MG/DL (ref 0–1)
BILIRUBIN URINE: NEGATIVE
BLOOD, URINE: ABNORMAL
BUN BLDV-MCNC: 54 MG/DL (ref 7–20)
CALCIUM SERPL-MCNC: 9.3 MG/DL (ref 8.3–10.6)
CHLORIDE BLD-SCNC: 97 MMOL/L (ref 99–110)
CLARITY: ABNORMAL
CO2: 20 MMOL/L (ref 21–32)
COLOR: YELLOW
CREAT SERPL-MCNC: 1.6 MG/DL (ref 0.6–1.2)
EOSINOPHILS ABSOLUTE: 0.1 K/UL (ref 0–0.6)
EOSINOPHILS RELATIVE PERCENT: 0.7 %
EPITHELIAL CELLS, UA: 2 /HPF (ref 0–5)
GFR AFRICAN AMERICAN: 38
GFR NON-AFRICAN AMERICAN: 31
GLOBULIN: 3.7 G/DL
GLUCOSE BLD-MCNC: 309 MG/DL (ref 70–99)
GLUCOSE URINE: NEGATIVE MG/DL
HCT VFR BLD CALC: 40.4 % (ref 36–48)
HEMOGLOBIN: 13.7 G/DL (ref 12–16)
HYALINE CASTS: 0 /LPF (ref 0–8)
KETONES, URINE: ABNORMAL MG/DL
LACTIC ACID, SEPSIS: 1.5 MMOL/L (ref 0.4–1.9)
LEUKOCYTE ESTERASE, URINE: ABNORMAL
LYMPHOCYTES ABSOLUTE: 1.7 K/UL (ref 1–5.1)
LYMPHOCYTES RELATIVE PERCENT: 20.8 %
MCH RBC QN AUTO: 29.7 PG (ref 26–34)
MCHC RBC AUTO-ENTMCNC: 33.8 G/DL (ref 31–36)
MCV RBC AUTO: 87.9 FL (ref 80–100)
MICROSCOPIC EXAMINATION: YES
MONOCYTES ABSOLUTE: 1 K/UL (ref 0–1.3)
MONOCYTES RELATIVE PERCENT: 12.7 %
NEUTROPHILS ABSOLUTE: 5.3 K/UL (ref 1.7–7.7)
NEUTROPHILS RELATIVE PERCENT: 65.5 %
NITRITE, URINE: NEGATIVE
PDW BLD-RTO: 13.4 % (ref 12.4–15.4)
PH UA: 5.5 (ref 5–8)
PLATELET # BLD: 303 K/UL (ref 135–450)
PMV BLD AUTO: 8.6 FL (ref 5–10.5)
POTASSIUM SERPL-SCNC: 4.5 MMOL/L (ref 3.5–5.1)
PROTEIN UA: 30 MG/DL
RBC # BLD: 4.6 M/UL (ref 4–5.2)
RBC UA: 9 /HPF (ref 0–4)
SODIUM BLD-SCNC: 133 MMOL/L (ref 136–145)
SPECIFIC GRAVITY UA: 1.01 (ref 1–1.03)
TOTAL PROTEIN: 7.7 G/DL (ref 6.4–8.2)
URINE REFLEX TO CULTURE: YES
URINE TYPE: ABNORMAL
UROBILINOGEN, URINE: 1 E.U./DL
WBC # BLD: 8.1 K/UL (ref 4–11)
WBC UA: 625 /HPF (ref 0–5)

## 2019-03-17 PROCEDURE — 96361 HYDRATE IV INFUSION ADD-ON: CPT

## 2019-03-17 PROCEDURE — 71046 X-RAY EXAM CHEST 2 VIEWS: CPT

## 2019-03-17 PROCEDURE — 80053 COMPREHEN METABOLIC PANEL: CPT

## 2019-03-17 PROCEDURE — 87040 BLOOD CULTURE FOR BACTERIA: CPT

## 2019-03-17 PROCEDURE — 99283 EMERGENCY DEPT VISIT LOW MDM: CPT

## 2019-03-17 PROCEDURE — 96374 THER/PROPH/DIAG INJ IV PUSH: CPT

## 2019-03-17 PROCEDURE — 81001 URINALYSIS AUTO W/SCOPE: CPT

## 2019-03-17 PROCEDURE — 2580000003 HC RX 258: Performed by: NURSE PRACTITIONER

## 2019-03-17 PROCEDURE — 87086 URINE CULTURE/COLONY COUNT: CPT

## 2019-03-17 PROCEDURE — 87077 CULTURE AEROBIC IDENTIFY: CPT

## 2019-03-17 PROCEDURE — 85025 COMPLETE CBC W/AUTO DIFF WBC: CPT

## 2019-03-17 PROCEDURE — 87186 SC STD MICRODIL/AGAR DIL: CPT

## 2019-03-17 PROCEDURE — 6360000002 HC RX W HCPCS: Performed by: NURSE PRACTITIONER

## 2019-03-17 PROCEDURE — 83605 ASSAY OF LACTIC ACID: CPT

## 2019-03-17 RX ORDER — 0.9 % SODIUM CHLORIDE 0.9 %
1000 INTRAVENOUS SOLUTION INTRAVENOUS ONCE
Status: COMPLETED | OUTPATIENT
Start: 2019-03-17 | End: 2019-03-18

## 2019-03-17 RX ADMIN — Medication 1 G: at 23:19

## 2019-03-17 RX ADMIN — SODIUM CHLORIDE 1000 ML: 9 INJECTION, SOLUTION INTRAVENOUS at 23:19

## 2019-03-18 VITALS
BODY MASS INDEX: 23.98 KG/M2 | RESPIRATION RATE: 18 BRPM | TEMPERATURE: 98 F | HEART RATE: 99 BPM | HEIGHT: 61 IN | SYSTOLIC BLOOD PRESSURE: 150 MMHG | WEIGHT: 127 LBS | DIASTOLIC BLOOD PRESSURE: 68 MMHG | OXYGEN SATURATION: 99 %

## 2019-03-18 RX ORDER — CEPHALEXIN 500 MG/1
500 CAPSULE ORAL 2 TIMES DAILY
Qty: 20 CAPSULE | Refills: 0 | Status: SHIPPED | OUTPATIENT
Start: 2019-03-18 | End: 2019-03-28

## 2019-03-18 ASSESSMENT — ENCOUNTER SYMPTOMS
ABDOMINAL PAIN: 0
CHEST TIGHTNESS: 0
SHORTNESS OF BREATH: 0
VOMITING: 0
NAUSEA: 0
DIARRHEA: 0

## 2019-03-19 LAB
ORGANISM: ABNORMAL
URINE CULTURE, ROUTINE: ABNORMAL
URINE CULTURE, ROUTINE: ABNORMAL

## 2019-03-21 ENCOUNTER — HOSPITAL ENCOUNTER (EMERGENCY)
Age: 79
Discharge: HOME | End: 2019-03-21
Attending: EMERGENCY MEDICINE
Payer: MEDICARE

## 2019-03-21 ENCOUNTER — OFFICE VISIT (OUTPATIENT)
Dept: INTERNAL MEDICINE CLINIC | Age: 79
End: 2019-03-21
Payer: MEDICARE

## 2019-03-21 VITALS
DIASTOLIC BLOOD PRESSURE: 54 MMHG | TEMPERATURE: 97.5 F | BODY MASS INDEX: 22.11 KG/M2 | HEART RATE: 95 BPM | WEIGHT: 117 LBS | OXYGEN SATURATION: 97 % | SYSTOLIC BLOOD PRESSURE: 98 MMHG

## 2019-03-21 VITALS
WEIGHT: 117 LBS | SYSTOLIC BLOOD PRESSURE: 193 MMHG | OXYGEN SATURATION: 91 % | BODY MASS INDEX: 20.73 KG/M2 | HEART RATE: 86 BPM | HEIGHT: 63 IN | TEMPERATURE: 98.8 F | DIASTOLIC BLOOD PRESSURE: 83 MMHG | RESPIRATION RATE: 16 BRPM

## 2019-03-21 DIAGNOSIS — I10 ESSENTIAL HYPERTENSION: ICD-10-CM

## 2019-03-21 DIAGNOSIS — E11.8 TYPE 2 DIABETES MELLITUS WITH COMPLICATION, WITH LONG-TERM CURRENT USE OF INSULIN (HCC): ICD-10-CM

## 2019-03-21 DIAGNOSIS — N30.00 ACUTE CYSTITIS WITHOUT HEMATURIA: Primary | ICD-10-CM

## 2019-03-21 DIAGNOSIS — Z79.4 TYPE 2 DIABETES MELLITUS WITH COMPLICATION, WITH LONG-TERM CURRENT USE OF INSULIN (HCC): ICD-10-CM

## 2019-03-21 DIAGNOSIS — I95.9 HYPOTENSION, UNSPECIFIED HYPOTENSION TYPE: Primary | ICD-10-CM

## 2019-03-21 DIAGNOSIS — R63.4 WEIGHT LOSS: ICD-10-CM

## 2019-03-21 DIAGNOSIS — R63.4 WEIGHT LOSS, UNINTENTIONAL: ICD-10-CM

## 2019-03-21 DIAGNOSIS — R53.83 OTHER FATIGUE: ICD-10-CM

## 2019-03-21 LAB
A/G RATIO: 1.1 (ref 1.1–2.2)
ALBUMIN SERPL-MCNC: 3.6 G/DL (ref 3.4–5)
ALP BLD-CCNC: 106 U/L (ref 40–129)
ALT SERPL-CCNC: 9 U/L (ref 10–40)
ANION GAP SERPL CALCULATED.3IONS-SCNC: 13 MMOL/L (ref 3–16)
AST SERPL-CCNC: 20 U/L (ref 15–37)
BASOPHILS ABSOLUTE: 0 K/UL (ref 0–0.2)
BASOPHILS RELATIVE PERCENT: 0.2 %
BILIRUB SERPL-MCNC: 0.3 MG/DL (ref 0–1)
BILIRUBIN URINE: NEGATIVE
BLOOD, URINE: ABNORMAL
BUN BLDV-MCNC: 22 MG/DL (ref 7–20)
CALCIUM SERPL-MCNC: 8.7 MG/DL (ref 8.3–10.6)
CHLORIDE BLD-SCNC: 97 MMOL/L (ref 99–110)
CLARITY: ABNORMAL
CO2: 22 MMOL/L (ref 21–32)
COLOR: YELLOW
CREAT SERPL-MCNC: 1.4 MG/DL (ref 0.6–1.2)
EKG ATRIAL RATE: 85 BPM
EKG DIAGNOSIS: NORMAL
EKG P AXIS: 48 DEGREES
EKG P-R INTERVAL: 134 MS
EKG Q-T INTERVAL: 368 MS
EKG QRS DURATION: 74 MS
EKG QTC CALCULATION (BAZETT): 437 MS
EKG R AXIS: -3 DEGREES
EKG T AXIS: 24 DEGREES
EKG VENTRICULAR RATE: 85 BPM
EOSINOPHILS ABSOLUTE: 0 K/UL (ref 0–0.6)
EOSINOPHILS RELATIVE PERCENT: 0.2 %
EPITHELIAL CELLS, UA: 3 /HPF (ref 0–5)
GFR AFRICAN AMERICAN: 44
GFR NON-AFRICAN AMERICAN: 36
GLOBULIN: 3.3 G/DL
GLUCOSE BLD-MCNC: 266 MG/DL (ref 70–99)
GLUCOSE BLD-MCNC: 281 MG/DL
GLUCOSE URINE: 100 MG/DL
HCT VFR BLD CALC: 42.3 % (ref 36–48)
HEMOGLOBIN: 14 G/DL (ref 12–16)
HYALINE CASTS: 8 /LPF (ref 0–8)
KETONES, URINE: NEGATIVE MG/DL
LACTIC ACID: 1.5 MMOL/L (ref 0.4–2)
LEUKOCYTE ESTERASE, URINE: ABNORMAL
LYMPHOCYTES ABSOLUTE: 1.6 K/UL (ref 1–5.1)
LYMPHOCYTES RELATIVE PERCENT: 32.4 %
MCH RBC QN AUTO: 29.4 PG (ref 26–34)
MCHC RBC AUTO-ENTMCNC: 33.2 G/DL (ref 31–36)
MCV RBC AUTO: 88.6 FL (ref 80–100)
MICROSCOPIC EXAMINATION: YES
MONOCYTES ABSOLUTE: 0.5 K/UL (ref 0–1.3)
MONOCYTES RELATIVE PERCENT: 10.7 %
NEUTROPHILS ABSOLUTE: 2.8 K/UL (ref 1.7–7.7)
NEUTROPHILS RELATIVE PERCENT: 56.5 %
NITRITE, URINE: NEGATIVE
PDW BLD-RTO: 13.6 % (ref 12.4–15.4)
PH UA: 5.5 (ref 5–8)
PLATELET # BLD: 237 K/UL (ref 135–450)
PMV BLD AUTO: 8.2 FL (ref 5–10.5)
POTASSIUM SERPL-SCNC: 4.5 MMOL/L (ref 3.5–5.1)
PROTEIN UA: 30 MG/DL
RBC # BLD: 4.77 M/UL (ref 4–5.2)
RBC UA: 17 /HPF (ref 0–4)
SODIUM BLD-SCNC: 132 MMOL/L (ref 136–145)
SPECIFIC GRAVITY UA: 1.01 (ref 1–1.03)
TOTAL PROTEIN: 6.9 G/DL (ref 6.4–8.2)
URINE REFLEX TO CULTURE: YES
URINE TYPE: ABNORMAL
UROBILINOGEN, URINE: 1 E.U./DL
WBC # BLD: 5 K/UL (ref 4–11)
WBC UA: 30 /HPF (ref 0–5)

## 2019-03-21 PROCEDURE — 2580000003 HC RX 258: Performed by: PHYSICIAN ASSISTANT

## 2019-03-21 PROCEDURE — 82962 GLUCOSE BLOOD TEST: CPT | Performed by: NURSE PRACTITIONER

## 2019-03-21 PROCEDURE — 81001 URINALYSIS AUTO W/SCOPE: CPT

## 2019-03-21 PROCEDURE — 85025 COMPLETE CBC W/AUTO DIFF WBC: CPT

## 2019-03-21 PROCEDURE — 96374 THER/PROPH/DIAG INJ IV PUSH: CPT

## 2019-03-21 PROCEDURE — 96361 HYDRATE IV INFUSION ADD-ON: CPT

## 2019-03-21 PROCEDURE — 87040 BLOOD CULTURE FOR BACTERIA: CPT

## 2019-03-21 PROCEDURE — 80053 COMPREHEN METABOLIC PANEL: CPT

## 2019-03-21 PROCEDURE — 87086 URINE CULTURE/COLONY COUNT: CPT

## 2019-03-21 PROCEDURE — 83605 ASSAY OF LACTIC ACID: CPT

## 2019-03-21 PROCEDURE — 99214 OFFICE O/P EST MOD 30 MIN: CPT | Performed by: NURSE PRACTITIONER

## 2019-03-21 PROCEDURE — 93010 ELECTROCARDIOGRAM REPORT: CPT | Performed by: INTERNAL MEDICINE

## 2019-03-21 PROCEDURE — 6360000002 HC RX W HCPCS: Performed by: PHYSICIAN ASSISTANT

## 2019-03-21 PROCEDURE — 93005 ELECTROCARDIOGRAM TRACING: CPT | Performed by: PHYSICIAN ASSISTANT

## 2019-03-21 PROCEDURE — 99285 EMERGENCY DEPT VISIT HI MDM: CPT

## 2019-03-21 RX ORDER — ONDANSETRON 2 MG/ML
4 INJECTION INTRAMUSCULAR; INTRAVENOUS ONCE
Status: COMPLETED | OUTPATIENT
Start: 2019-03-21 | End: 2019-03-21

## 2019-03-21 RX ORDER — 0.9 % SODIUM CHLORIDE 0.9 %
1000 INTRAVENOUS SOLUTION INTRAVENOUS ONCE
Status: COMPLETED | OUTPATIENT
Start: 2019-03-21 | End: 2019-03-21

## 2019-03-21 RX ADMIN — SODIUM CHLORIDE 1000 ML: 9 INJECTION, SOLUTION INTRAVENOUS at 15:09

## 2019-03-21 RX ADMIN — ONDANSETRON 4 MG: 2 INJECTION INTRAMUSCULAR; INTRAVENOUS at 15:10

## 2019-03-21 ASSESSMENT — PATIENT HEALTH QUESTIONNAIRE - PHQ9
SUM OF ALL RESPONSES TO PHQ QUESTIONS 1-9: 0
2. FEELING DOWN, DEPRESSED OR HOPELESS: 0
SUM OF ALL RESPONSES TO PHQ QUESTIONS 1-9: 0
1. LITTLE INTEREST OR PLEASURE IN DOING THINGS: 0
SUM OF ALL RESPONSES TO PHQ9 QUESTIONS 1 & 2: 0

## 2019-03-22 LAB — URINE CULTURE, ROUTINE: NORMAL

## 2019-03-22 ASSESSMENT — ENCOUNTER SYMPTOMS
SHORTNESS OF BREATH: 0
RHINORRHEA: 0
NAUSEA: 0
COUGH: 0
DIARRHEA: 0
VOMITING: 0
ABDOMINAL PAIN: 0

## 2019-03-23 LAB
BLOOD CULTURE, ROUTINE: NORMAL
CULTURE, BLOOD 2: NORMAL

## 2019-03-26 LAB — BLOOD CULTURE, ROUTINE: NORMAL

## 2019-08-01 ENCOUNTER — PRE-PROCEDURE TELEPHONE (OUTPATIENT)
Dept: CARDIAC CATH/INVASIVE PROCEDURES | Age: 79
End: 2019-08-01

## 2019-08-02 ENCOUNTER — HOSPITAL ENCOUNTER (OUTPATIENT)
Dept: INTERVENTIONAL RADIOLOGY/VASCULAR | Age: 79
Discharge: HOME | End: 2019-08-02
Attending: RADIOLOGY | Admitting: RADIOLOGY
Payer: MEDICARE

## 2019-08-02 VITALS — HEIGHT: 63 IN | TEMPERATURE: 98.5 F | BODY MASS INDEX: 21.09 KG/M2 | WEIGHT: 119 LBS

## 2019-08-02 LAB
HCT VFR BLD CALC: 44.1 % (ref 36–48)
HEMOGLOBIN: 14.9 G/DL (ref 12–16)
INR BLD: 0.92 (ref 0.86–1.14)
MCH RBC QN AUTO: 29.6 PG (ref 26–34)
MCHC RBC AUTO-ENTMCNC: 33.7 G/DL (ref 31–36)
MCV RBC AUTO: 88 FL (ref 80–100)
PDW BLD-RTO: 13.9 % (ref 12.4–15.4)
PLATELET # BLD: 202 K/UL (ref 135–450)
PMV BLD AUTO: 8.2 FL (ref 5–10.5)
PROTHROMBIN TIME: 10.5 SEC (ref 9.8–13)
RBC # BLD: 5.01 M/UL (ref 4–5.2)
WBC # BLD: 5.2 K/UL (ref 4–11)

## 2019-08-02 PROCEDURE — 85610 PROTHROMBIN TIME: CPT

## 2019-08-02 PROCEDURE — 75901 REMOVE CVA DEVICE OBSTRUCT: CPT

## 2019-08-02 PROCEDURE — 99152 MOD SED SAME PHYS/QHP 5/>YRS: CPT

## 2019-08-02 PROCEDURE — 6360000002 HC RX W HCPCS

## 2019-08-02 PROCEDURE — 36595 MECH REMOV TUNNELED CV CATH: CPT

## 2019-08-02 PROCEDURE — 6360000004 HC RX CONTRAST MEDICATION: Performed by: RADIOLOGY

## 2019-08-02 PROCEDURE — 85027 COMPLETE CBC AUTOMATED: CPT

## 2019-08-02 PROCEDURE — C1769 GUIDE WIRE: HCPCS

## 2019-08-02 PROCEDURE — 2709999900 HC NON-CHARGEABLE SUPPLY

## 2019-08-02 PROCEDURE — 36010 PLACE CATHETER IN VEIN: CPT

## 2019-08-02 PROCEDURE — C1894 INTRO/SHEATH, NON-LASER: HCPCS

## 2019-08-02 PROCEDURE — 99153 MOD SED SAME PHYS/QHP EA: CPT

## 2019-08-02 PROCEDURE — C1773 RET DEV, INSERTABLE: HCPCS

## 2019-08-02 RX ORDER — IODIXANOL 320 MG/ML
50 INJECTION, SOLUTION INTRAVASCULAR
Status: COMPLETED | OUTPATIENT
Start: 2019-08-02 | End: 2019-08-02

## 2019-08-02 RX ADMIN — IODIXANOL 50 ML: 320 INJECTION, SOLUTION INTRAVASCULAR at 14:56

## 2019-08-02 NOTE — H&P
IR  H & P      Patient:  Elizabeth Sherman   :   1940      Relevant patient history reviewed and discussed. CC: port malfucntion    The procedure including risks and benefits was discussed at length with the patient (or designated family member) and all questions were answered. Informed consent to proceed with the procedure was given. Heart : regular rate and rhythm  Lungs : clear, breathing easily  Airway Assessment: Mallampati 3  Condition : stable    Woody Scale: Activity:  2 - Able to move 4 extremities voluntarily on command  Respiration:  2 - Able to breathe deeply and cough freely  Circulation:  2 - BP+/- 20mmHg of normal  Consciousness:  2 - Fully awake  Oxygen Saturation (color):  2 - Able to maintain oxygen saturation >92% on room air      [facility] reviewed and agreed. Medications reviewed. No current facility-administered medications on file prior to encounter. Current Outpatient Medications on File Prior to Encounter   Medication Sig Dispense Refill    pravastatin (PRAVACHOL) 40 MG tablet Take 1 tablet by mouth daily 30 tablet 3    ergocalciferol (ERGOCALCIFEROL) 50801 units capsule Take 1 capsule by mouth once a week 30 capsule 0    insulin glargine (TOUJEO MAX SOLOSTAR) 300 UNIT/ML injection pen Inject 18 Units into the skin nightly 3 pen 3    linagliptin (TRADJENTA) 5 MG tablet Take 1 tablet by mouth daily 42 tablet 0    calcium-vitamin D (OSCAL-500) 500-200 MG-UNIT per tablet Take 1 tablet by mouth 2 times daily 30 tablet 3    Multiple Vitamins-Minerals (MULTIVITAMIN PO) Take  by mouth.  aspirin 81 MG EC tablet Take 81 mg by mouth daily.       Blood Pressure Monitoring (MICROLIFE DELUXE BP MONITOR) KIT 1 applicator by Does not apply route daily 1 kit 0    Nutritional Supplements (ENSURE PLUS HN) LIQD Take 1 Can by mouth daily chocolate 50 Can 3    chlorthalidone (HYGROTON) 25 MG tablet One half tablet daily 30 tablet 1    EASY TOUCH PEN NEEDLES 31G X
confirming patency    No extravasation    Fluoroscopy time 8.4 minute  Ultrasound images: 1, sent to electronic record  Complications: None  Fluoroscopic images: 27    IMPRESSION:  1. Adherence of the portacatheter to the lateral wall of the superior vena cava with additional fibrin sheath. 2. Uneventful retrieval of intravascular catheter and repositioning in the central lumen of the superior vena cava successful stripping.   3. Catheter is now functional and Patent and ready for use

## 2019-08-11 ENCOUNTER — HOSPITAL ENCOUNTER (INPATIENT)
Age: 79
LOS: 3 days | Discharge: HOME HEALTH | DRG: 193 | End: 2019-08-14
Attending: EMERGENCY MEDICINE | Admitting: PEDIATRICS
Payer: MEDICARE

## 2019-08-11 ENCOUNTER — APPOINTMENT (OUTPATIENT)
Dept: GENERAL RADIOLOGY | Age: 79
DRG: 193 | End: 2019-08-11
Payer: MEDICARE

## 2019-08-11 ENCOUNTER — APPOINTMENT (OUTPATIENT)
Dept: CT IMAGING | Age: 79
DRG: 193 | End: 2019-08-11
Payer: MEDICARE

## 2019-08-11 PROBLEM — Z85.6 HX OF CHRONIC LYMPHOCYTIC LEUKEMIA: Status: ACTIVE | Noted: 2019-08-11

## 2019-08-11 PROBLEM — R09.02 HYPOXIA: Status: ACTIVE | Noted: 2019-08-11

## 2019-08-11 LAB
A/G RATIO: 1.2 (ref 1.1–2.2)
ALBUMIN SERPL-MCNC: 4.3 G/DL (ref 3.4–5)
ALP BLD-CCNC: 114 U/L (ref 40–129)
ALT SERPL-CCNC: 14 U/L (ref 10–40)
ANION GAP SERPL CALCULATED.3IONS-SCNC: 20 MMOL/L (ref 3–16)
AST SERPL-CCNC: 26 U/L (ref 15–37)
BASE EXCESS VENOUS: -9.8 MMOL/L (ref -3–3)
BASOPHILS ABSOLUTE: 0 K/UL (ref 0–0.2)
BASOPHILS RELATIVE PERCENT: 0.2 %
BILIRUB SERPL-MCNC: 0.6 MG/DL (ref 0–1)
BUN BLDV-MCNC: 65 MG/DL (ref 7–20)
CALCIUM SERPL-MCNC: 8.7 MG/DL (ref 8.3–10.6)
CARBOXYHEMOGLOBIN: 2.8 % (ref 0–1.5)
CHLORIDE BLD-SCNC: 96 MMOL/L (ref 99–110)
CO2: 15 MMOL/L (ref 21–32)
CREAT SERPL-MCNC: 2.2 MG/DL (ref 0.6–1.2)
EOSINOPHILS ABSOLUTE: 0 K/UL (ref 0–0.6)
EOSINOPHILS RELATIVE PERCENT: 0.2 %
GFR AFRICAN AMERICAN: 26
GFR NON-AFRICAN AMERICAN: 22
GLOBULIN: 3.7 G/DL
GLUCOSE BLD-MCNC: 164 MG/DL (ref 70–99)
GLUCOSE BLD-MCNC: 281 MG/DL (ref 70–99)
HCO3 VENOUS: 16.5 MMOL/L (ref 23–29)
HCT VFR BLD CALC: 43 % (ref 36–48)
HEMOGLOBIN, VEN, REDUCED: 10 %
HEMOGLOBIN: 14.5 G/DL (ref 12–16)
LACTIC ACID: 1.1 MMOL/L (ref 0.4–2)
LYMPHOCYTES ABSOLUTE: 1.7 K/UL (ref 1–5.1)
LYMPHOCYTES RELATIVE PERCENT: 15.9 %
MCH RBC QN AUTO: 29.3 PG (ref 26–34)
MCHC RBC AUTO-ENTMCNC: 33.7 G/DL (ref 31–36)
MCV RBC AUTO: 86.8 FL (ref 80–100)
METHEMOGLOBIN VENOUS: 0.3 %
MONOCYTES ABSOLUTE: 0.7 K/UL (ref 0–1.3)
MONOCYTES RELATIVE PERCENT: 6.8 %
NEUTROPHILS ABSOLUTE: 8.2 K/UL (ref 1.7–7.7)
NEUTROPHILS RELATIVE PERCENT: 76.9 %
O2 CONTENT, VEN: 18 VOL %
O2 SAT, VEN: 90 %
O2 THERAPY: ABNORMAL
PCO2, VEN: 36.8 MMHG (ref 40–50)
PDW BLD-RTO: 13.9 % (ref 12.4–15.4)
PERFORMED ON: ABNORMAL
PH VENOUS: 7.26 (ref 7.35–7.45)
PLATELET # BLD: 150 K/UL (ref 135–450)
PMV BLD AUTO: 9.5 FL (ref 5–10.5)
PO2, VEN: 62.7 MMHG (ref 25–40)
POTASSIUM REFLEX MAGNESIUM: 4.3 MMOL/L (ref 3.5–5.1)
PRO-BNP: 159 PG/ML (ref 0–449)
RBC # BLD: 4.95 M/UL (ref 4–5.2)
SODIUM BLD-SCNC: 131 MMOL/L (ref 136–145)
TCO2 CALC VENOUS: 39 MMOL/L
TOTAL PROTEIN: 8 G/DL (ref 6.4–8.2)
TROPONIN: <0.01 NG/ML
WBC # BLD: 10.7 K/UL (ref 4–11)

## 2019-08-11 PROCEDURE — 94760 N-INVAS EAR/PLS OXIMETRY 1: CPT

## 2019-08-11 PROCEDURE — 6360000002 HC RX W HCPCS: Performed by: NURSE PRACTITIONER

## 2019-08-11 PROCEDURE — 82803 BLOOD GASES ANY COMBINATION: CPT

## 2019-08-11 PROCEDURE — 96375 TX/PRO/DX INJ NEW DRUG ADDON: CPT

## 2019-08-11 PROCEDURE — 80053 COMPREHEN METABOLIC PANEL: CPT

## 2019-08-11 PROCEDURE — 99285 EMERGENCY DEPT VISIT HI MDM: CPT

## 2019-08-11 PROCEDURE — 36415 COLL VENOUS BLD VENIPUNCTURE: CPT

## 2019-08-11 PROCEDURE — 6370000000 HC RX 637 (ALT 250 FOR IP): Performed by: NURSE PRACTITIONER

## 2019-08-11 PROCEDURE — 1200000000 HC SEMI PRIVATE

## 2019-08-11 PROCEDURE — 2700000000 HC OXYGEN THERAPY PER DAY

## 2019-08-11 PROCEDURE — 85025 COMPLETE CBC W/AUTO DIFF WBC: CPT

## 2019-08-11 PROCEDURE — 83605 ASSAY OF LACTIC ACID: CPT

## 2019-08-11 PROCEDURE — 2580000003 HC RX 258: Performed by: EMERGENCY MEDICINE

## 2019-08-11 PROCEDURE — 84484 ASSAY OF TROPONIN QUANT: CPT

## 2019-08-11 PROCEDURE — 96372 THER/PROPH/DIAG INJ SC/IM: CPT

## 2019-08-11 PROCEDURE — 93005 ELECTROCARDIOGRAM TRACING: CPT | Performed by: EMERGENCY MEDICINE

## 2019-08-11 PROCEDURE — 96361 HYDRATE IV INFUSION ADD-ON: CPT

## 2019-08-11 PROCEDURE — 87040 BLOOD CULTURE FOR BACTERIA: CPT

## 2019-08-11 PROCEDURE — 83880 ASSAY OF NATRIURETIC PEPTIDE: CPT

## 2019-08-11 PROCEDURE — 96376 TX/PRO/DX INJ SAME DRUG ADON: CPT

## 2019-08-11 PROCEDURE — 96367 TX/PROPH/DG ADDL SEQ IV INF: CPT

## 2019-08-11 PROCEDURE — 71046 X-RAY EXAM CHEST 2 VIEWS: CPT

## 2019-08-11 PROCEDURE — 6360000002 HC RX W HCPCS: Performed by: EMERGENCY MEDICINE

## 2019-08-11 PROCEDURE — 2580000003 HC RX 258: Performed by: NURSE PRACTITIONER

## 2019-08-11 PROCEDURE — 96365 THER/PROPH/DIAG IV INF INIT: CPT

## 2019-08-11 RX ORDER — ONDANSETRON 2 MG/ML
INJECTION INTRAMUSCULAR; INTRAVENOUS
Status: DISPENSED
Start: 2019-08-11 | End: 2019-08-12

## 2019-08-11 RX ORDER — ACETAMINOPHEN 325 MG/1
650 TABLET ORAL EVERY 4 HOURS PRN
Status: DISCONTINUED | OUTPATIENT
Start: 2019-08-11 | End: 2019-08-14 | Stop reason: HOSPADM

## 2019-08-11 RX ORDER — HEPARIN SODIUM 5000 [USP'U]/ML
5000 INJECTION, SOLUTION INTRAVENOUS; SUBCUTANEOUS 2 TIMES DAILY
Status: DISCONTINUED | OUTPATIENT
Start: 2019-08-11 | End: 2019-08-14 | Stop reason: HOSPADM

## 2019-08-11 RX ORDER — OYSTER SHELL CALCIUM WITH VITAMIN D 500; 200 MG/1; [IU]/1
1 TABLET, FILM COATED ORAL 2 TIMES DAILY
Status: DISCONTINUED | OUTPATIENT
Start: 2019-08-12 | End: 2019-08-14 | Stop reason: HOSPADM

## 2019-08-11 RX ORDER — POTASSIUM CHLORIDE 7.45 MG/ML
10 INJECTION INTRAVENOUS PRN
Status: DISCONTINUED | OUTPATIENT
Start: 2019-08-11 | End: 2019-08-14 | Stop reason: HOSPADM

## 2019-08-11 RX ORDER — CHLORTHALIDONE 25 MG/1
12.5 TABLET ORAL DAILY
Status: DISCONTINUED | OUTPATIENT
Start: 2019-08-12 | End: 2019-08-12

## 2019-08-11 RX ORDER — PRAVASTATIN SODIUM 20 MG
40 TABLET ORAL NIGHTLY
Status: DISCONTINUED | OUTPATIENT
Start: 2019-08-11 | End: 2019-08-14 | Stop reason: HOSPADM

## 2019-08-11 RX ORDER — POTASSIUM CHLORIDE 20 MEQ/1
40 TABLET, EXTENDED RELEASE ORAL PRN
Status: DISCONTINUED | OUTPATIENT
Start: 2019-08-11 | End: 2019-08-14 | Stop reason: HOSPADM

## 2019-08-11 RX ORDER — ALBUTEROL SULFATE 90 UG/1
2 AEROSOL, METERED RESPIRATORY (INHALATION) EVERY 6 HOURS PRN
Status: DISCONTINUED | OUTPATIENT
Start: 2019-08-11 | End: 2019-08-14 | Stop reason: HOSPADM

## 2019-08-11 RX ORDER — SODIUM CHLORIDE 0.9 % (FLUSH) 0.9 %
10 SYRINGE (ML) INJECTION EVERY 12 HOURS SCHEDULED
Status: DISCONTINUED | OUTPATIENT
Start: 2019-08-11 | End: 2019-08-14 | Stop reason: HOSPADM

## 2019-08-11 RX ORDER — ASPIRIN 81 MG/1
81 TABLET ORAL DAILY
Status: DISCONTINUED | OUTPATIENT
Start: 2019-08-12 | End: 2019-08-14 | Stop reason: HOSPADM

## 2019-08-11 RX ORDER — 0.9 % SODIUM CHLORIDE 0.9 %
1000 INTRAVENOUS SOLUTION INTRAVENOUS ONCE
Status: COMPLETED | OUTPATIENT
Start: 2019-08-11 | End: 2019-08-11

## 2019-08-11 RX ORDER — ONDANSETRON 2 MG/ML
4 INJECTION INTRAMUSCULAR; INTRAVENOUS ONCE
Status: COMPLETED | OUTPATIENT
Start: 2019-08-11 | End: 2019-08-11

## 2019-08-11 RX ORDER — ONDANSETRON 2 MG/ML
4 INJECTION INTRAMUSCULAR; INTRAVENOUS EVERY 6 HOURS PRN
Status: DISCONTINUED | OUTPATIENT
Start: 2019-08-11 | End: 2019-08-14 | Stop reason: HOSPADM

## 2019-08-11 RX ORDER — SODIUM CHLORIDE 0.9 % (FLUSH) 0.9 %
10 SYRINGE (ML) INJECTION PRN
Status: DISCONTINUED | OUTPATIENT
Start: 2019-08-11 | End: 2019-08-14 | Stop reason: HOSPADM

## 2019-08-11 RX ORDER — SODIUM CHLORIDE 9 MG/ML
INJECTION, SOLUTION INTRAVENOUS CONTINUOUS
Status: DISCONTINUED | OUTPATIENT
Start: 2019-08-11 | End: 2019-08-14 | Stop reason: HOSPADM

## 2019-08-11 RX ORDER — AZITHROMYCIN 500 MG/1
500 INJECTION, POWDER, LYOPHILIZED, FOR SOLUTION INTRAVENOUS ONCE
Status: DISCONTINUED | OUTPATIENT
Start: 2019-08-11 | End: 2019-08-11 | Stop reason: CLARIF

## 2019-08-11 RX ADMIN — SODIUM CHLORIDE: 9 INJECTION, SOLUTION INTRAVENOUS at 23:24

## 2019-08-11 RX ADMIN — VANCOMYCIN HYDROCHLORIDE 750 MG: 750 INJECTION, POWDER, LYOPHILIZED, FOR SOLUTION INTRAVENOUS at 23:24

## 2019-08-11 RX ADMIN — ONDANSETRON 4 MG: 2 INJECTION INTRAMUSCULAR; INTRAVENOUS at 20:05

## 2019-08-11 RX ADMIN — AZITHROMYCIN MONOHYDRATE 500 MG: 500 INJECTION, POWDER, LYOPHILIZED, FOR SOLUTION INTRAVENOUS at 19:53

## 2019-08-11 RX ADMIN — Medication 10 ML: at 23:28

## 2019-08-11 RX ADMIN — SODIUM CHLORIDE 1000 ML: 9 INJECTION, SOLUTION INTRAVENOUS at 18:31

## 2019-08-11 RX ADMIN — PRAVASTATIN SODIUM 40 MG: 20 TABLET ORAL at 23:37

## 2019-08-11 RX ADMIN — Medication 1 G: at 19:39

## 2019-08-11 RX ADMIN — HEPARIN SODIUM 5000 UNITS: 5000 INJECTION INTRAVENOUS; SUBCUTANEOUS at 23:28

## 2019-08-11 ASSESSMENT — PAIN DESCRIPTION - PAIN TYPE: TYPE: ACUTE PAIN

## 2019-08-11 ASSESSMENT — PAIN SCALES - GENERAL
PAINLEVEL_OUTOF10: 0
PAINLEVEL_OUTOF10: 10

## 2019-08-11 ASSESSMENT — PAIN DESCRIPTION - LOCATION
LOCATION: LEG
LOCATION: LEG

## 2019-08-11 NOTE — ED NOTES
Dr Karla Diego at bedside to assess patient and update on plan of care.       Scout Rangel RN  08/11/19 [id number]

## 2019-08-11 NOTE — H&P
Hospital Medicine History & Physical      PCP: Og Medeiros, APRN - CNP    Date of Admission: 8/11/2019    Date of Service: Pt seen/examined on August 11, 2019 and Admitted to Inpatient with expected LOS greater than two midnights due to medical therapy. Chief Complaint:  \"I haven't been feeling well for awhile, ever since they did something with my port. \" C/o shortness of breath and fatigue, decreased appetite, Charley horses in her legs. \"I feel dehydrated. \"       History Of Present Illness:      78 y.o. female with PMHx of CLL in remission, COPD, (does not wear O2), DM Type 2, HLT, HTN,  presented to [facility] with granddaughter at bedside c/o weakness, decreased appetite, SOB and difficulty breathing for \"awhile. 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 Citlali Franco, hem/onc. She was worked up in the ER with findings of lower left lung volume with mild elevation of left hemidiaphragm. Bilateral basilar heterogeneous opacities, thought to be infectious or inflammatory, possible small left pleural effusion and osteopenia on (2 view) CXR. She was started on IV antibiotics for Healthcare Associated Pneumonia. We can decelerate as able per Infectious Disease recs who I will consult. She is somewhat hyponatremic with sodium 131, chloride is 96 and BUN/Cr 65/2.2 and glucose 164, , troponin neg. CBC is normal.  She is afebrile and slightly tachycardic. Due to her GELACIO, we cannot do a CT to r/o PE so will get VQ scan. We will hydrate for her GELACIO with one L bolus given in ED and gentle MIVF hydration going forward in the face of pleural effusion. For her hyperglycemia, we will use SSI while in-patient.       Past Medical History:          Diagnosis Date    CLL (chronic lymphocytic leukemia) (HCC)     COPD, severe ([facility] [address]    

## 2019-08-11 NOTE — ED NOTES
Bed: 30  Expected date:   Expected time:   Means of arrival:   Comments:  Wilder Arroyo RN  08/11/19 [id number]

## 2019-08-11 NOTE — ED PROVIDER NOTES
Normocephalic and atraumatic. Ears: External ears normal.      Nose: Nose normal.     Mouth: Membrane mucosa moist and pink. Eyes: No discharge. Neck: Supple. Trachea midline. Cardiovascular: RRR; no murmurs, rubs, or gallops. Pulmonary/Chest: Effort normal. No respiratory distress. CTAB. No stridor. No wheezes. No rales. Abdominal: Soft. No distension. Nontender  : Deferred  Rectal: Deferred   Musculoskeletal: Moves all extremities. No gross deformity. Neurological: Alert and oriented. Face symmetric. Speech is clear. Skin: Warm and dry. No rash. Psychiatric: Normal mood and affect. Behavior is normal.    Procedures      EKG  The Ekg interpreted by me in the absence of a cardiologist shows. Normal sinus rhythm. No specific STchanges appreciated. No evidence of acute ischemia. Isolated t wave inversion avl    Radiology  XR CHEST STANDARD (2 VW)   Final Result   Low left lung volume with mild elevation of left hemidiaphragm. Bilateral   basilar heterogeneous opacities. Differential considerations include   subsegmental atelectasis and a developing infectious/inflammatory process. Possible small left pleural effusion. Osteopenia.          NM LUNG SCAN PERFUSION ONLY    (Results Pending)       Labs  Results for orders placed or performed during the hospital encounter of 08/11/19   CBC Auto Differential   Result Value Ref Range    WBC 10.7 4.0 - 11.0 K/uL    RBC 4.95 4.00 - 5.20 M/uL    Hemoglobin 14.5 12.0 - 16.0 g/dL    Hematocrit 43.0 36.0 - 48.0 %    MCV 86.8 80.0 - 100.0 fL    MCH 29.3 26.0 - 34.0 pg    MCHC 33.7 31.0 - 36.0 g/dL    RDW 13.9 12.4 - 15.4 %    Platelets 293 795 - 523 K/uL    MPV 9.5 5.0 - 10.5 fL    Neutrophils % 76.9 %    Lymphocytes % 15.9 %    Monocytes % 6.8 %    Eosinophils % 0.2 %    Basophils % 0.2 %    Neutrophils # 8.2 (H) 1.7 - 7.7 K/uL    Lymphocytes # 1.7 1.0 - 5.1 K/uL    Monocytes # 0.7 0.0 - 1.3 K/uL    Eosinophils # 0.0 0.0 - 0.6 K/uL    Basophils # 0.0 0.0 - 0.2 K/uL   Comprehensive Metabolic Panel w/ Reflex to MG   Result Value Ref Range    Sodium 131 (L) 136 - 145 mmol/L    Potassium reflex Magnesium 4.3 3.5 - 5.1 mmol/L    Chloride 96 (L) 99 - 110 mmol/L    CO2 15 (L) 21 - 32 mmol/L    Anion Gap 20 (H) 3 - 16    Glucose 164 (H) 70 - 99 mg/dL    BUN 65 (H) 7 - 20 mg/dL    CREATININE 2.2 (H) 0.6 - 1.2 mg/dL    GFR Non-African American 22 (A) >60    GFR  26 (A) >60    Calcium 8.7 8.3 - 10.6 mg/dL    Total Protein 8.0 6.4 - 8.2 g/dL    Alb 4.3 3.4 - 5.0 g/dL    Albumin/Globulin Ratio 1.2 1.1 - 2.2    Total Bilirubin 0.6 0.0 - 1.0 mg/dL    Alkaline Phosphatase 114 40 - 129 U/L    ALT 14 10 - 40 U/L    AST 26 15 - 37 U/L    Globulin 3.7 g/dL   Troponin   Result Value Ref Range    Troponin <0.01 <0.01 ng/mL   Brain Natriuretic Peptide   Result Value Ref Range    Pro- 0 - 449 pg/mL   Lactic Acid, Plasma   Result Value Ref Range    Lactic Acid 1.1 0.4 - 2.0 mmol/L   Blood gas, venous   Result Value Ref Range    pH, Roberto 7.259 (L) 7.350 - 7.450    pCO2, Roberto 36.8 (L) 40.0 - 50.0 mmHg    pO2, Roberto 62.7 (H) 25.0 - 40.0 mmHg    HCO3, Venous 16.5 (L) 23.0 - 29.0 mmol/L    Base Excess, Roberto -9.8 (L) -3.0 - 3.0 mmol/L    O2 Sat, Roberto 90 Not Established %    Carboxyhemoglobin 2.8 (H) 0.0 - 1.5 %    MetHgb, Roberto 0.3 <1.5 %    TC02 (Calc), Roberto 39 Not Established mmol/L    O2 Content, Roberto 18 Not Established VOL %    O2 Therapy Unknown     Hemoglobin, Roberto, Reduced 10 %   EKG 12 Lead   Result Value Ref Range    Ventricular Rate 99 BPM    Atrial Rate 99 BPM    P-R Interval 124 ms    QRS Duration 76 ms    Q-T Interval 358 ms    QTc Calculation (Bazett) 459 ms    P Axis 16 degrees    R Axis 6 degrees    T Axis 106 degrees    Diagnosis       Normal sinus rhythmPossible Left atrial enlargementNonspecific ST and T wave abnormalityAbnormal ECG       Screenings           MDM and ED Course  Patient is a 80-year-old woman with past medical history of CLL, who presents

## 2019-08-12 ENCOUNTER — APPOINTMENT (OUTPATIENT)
Dept: ULTRASOUND IMAGING | Age: 79
DRG: 193 | End: 2019-08-12
Payer: MEDICARE

## 2019-08-12 ENCOUNTER — APPOINTMENT (OUTPATIENT)
Dept: NUCLEAR MEDICINE | Age: 79
DRG: 193 | End: 2019-08-12
Payer: MEDICARE

## 2019-08-12 PROBLEM — B34.8 PARAINFLUENZA TYPE 1 INFECTION: Status: ACTIVE | Noted: 2019-08-12

## 2019-08-12 LAB
ANION GAP SERPL CALCULATED.3IONS-SCNC: 13 MMOL/L (ref 3–16)
BUN BLDV-MCNC: 55 MG/DL (ref 7–20)
CALCIUM SERPL-MCNC: 7.6 MG/DL (ref 8.3–10.6)
CHLORIDE BLD-SCNC: 98 MMOL/L (ref 99–110)
CO2: 18 MMOL/L (ref 21–32)
CREAT SERPL-MCNC: 1.9 MG/DL (ref 0.6–1.2)
EKG ATRIAL RATE: 99 BPM
EKG DIAGNOSIS: NORMAL
EKG P AXIS: 16 DEGREES
EKG P-R INTERVAL: 124 MS
EKG Q-T INTERVAL: 358 MS
EKG QRS DURATION: 76 MS
EKG QTC CALCULATION (BAZETT): 459 MS
EKG R AXIS: 6 DEGREES
EKG T AXIS: 106 DEGREES
EKG VENTRICULAR RATE: 99 BPM
GFR AFRICAN AMERICAN: 31
GFR NON-AFRICAN AMERICAN: 25
GLUCOSE BLD-MCNC: 243 MG/DL (ref 70–99)
GLUCOSE BLD-MCNC: 258 MG/DL (ref 70–99)
GLUCOSE BLD-MCNC: 270 MG/DL (ref 70–99)
GLUCOSE BLD-MCNC: 358 MG/DL (ref 70–99)
GLUCOSE BLD-MCNC: 473 MG/DL (ref 70–99)
HCT VFR BLD CALC: 38.5 % (ref 36–48)
HEMOGLOBIN: 12.9 G/DL (ref 12–16)
MCH RBC QN AUTO: 29.2 PG (ref 26–34)
MCHC RBC AUTO-ENTMCNC: 33.5 G/DL (ref 31–36)
MCV RBC AUTO: 87.2 FL (ref 80–100)
ORGANISM: ABNORMAL
PDW BLD-RTO: 14 % (ref 12.4–15.4)
PERFORMED ON: ABNORMAL
PLATELET # BLD: 135 K/UL (ref 135–450)
PMV BLD AUTO: 9 FL (ref 5–10.5)
POTASSIUM REFLEX MAGNESIUM: 4.8 MMOL/L (ref 3.5–5.1)
RBC # BLD: 4.42 M/UL (ref 4–5.2)
REPORT: NORMAL
RESPIRATORY PANEL PCR: ABNORMAL
SODIUM BLD-SCNC: 129 MMOL/L (ref 136–145)
URIC ACID, SERUM: 8.2 MG/DL (ref 2.6–6)
WBC # BLD: 9.4 K/UL (ref 4–11)

## 2019-08-12 PROCEDURE — 85027 COMPLETE CBC AUTOMATED: CPT

## 2019-08-12 PROCEDURE — 96372 THER/PROPH/DIAG INJ SC/IM: CPT

## 2019-08-12 PROCEDURE — A9540 TC99M MAA: HCPCS | Performed by: NURSE PRACTITIONER

## 2019-08-12 PROCEDURE — 78582 LUNG VENTILAT&PERFUS IMAGING: CPT

## 2019-08-12 PROCEDURE — 87633 RESP VIRUS 12-25 TARGETS: CPT

## 2019-08-12 PROCEDURE — 84133 ASSAY OF URINE POTASSIUM: CPT

## 2019-08-12 PROCEDURE — 36415 COLL VENOUS BLD VENIPUNCTURE: CPT

## 2019-08-12 PROCEDURE — 87581 M.PNEUMON DNA AMP PROBE: CPT

## 2019-08-12 PROCEDURE — 84443 ASSAY THYROID STIM HORMONE: CPT

## 2019-08-12 PROCEDURE — 96367 TX/PROPH/DG ADDL SEQ IV INF: CPT

## 2019-08-12 PROCEDURE — 84300 ASSAY OF URINE SODIUM: CPT

## 2019-08-12 PROCEDURE — 2580000003 HC RX 258: Performed by: INTERNAL MEDICINE

## 2019-08-12 PROCEDURE — 3430000000 HC RX DIAGNOSTIC RADIOPHARMACEUTICAL: Performed by: NURSE PRACTITIONER

## 2019-08-12 PROCEDURE — 82436 ASSAY OF URINE CHLORIDE: CPT

## 2019-08-12 PROCEDURE — 99223 1ST HOSP IP/OBS HIGH 75: CPT | Performed by: INTERNAL MEDICINE

## 2019-08-12 PROCEDURE — 87641 MR-STAPH DNA AMP PROBE: CPT

## 2019-08-12 PROCEDURE — 6360000002 HC RX W HCPCS: Performed by: NURSE PRACTITIONER

## 2019-08-12 PROCEDURE — 2580000003 HC RX 258: Performed by: NURSE PRACTITIONER

## 2019-08-12 PROCEDURE — 87486 CHLMYD PNEUM DNA AMP PROBE: CPT

## 2019-08-12 PROCEDURE — 84540 ASSAY OF URINE/UREA-N: CPT

## 2019-08-12 PROCEDURE — 6360000002 HC RX W HCPCS: Performed by: INTERNAL MEDICINE

## 2019-08-12 PROCEDURE — 82607 VITAMIN B-12: CPT

## 2019-08-12 PROCEDURE — 6370000000 HC RX 637 (ALT 250 FOR IP): Performed by: NURSE PRACTITIONER

## 2019-08-12 PROCEDURE — 84550 ASSAY OF BLOOD/URIC ACID: CPT

## 2019-08-12 PROCEDURE — A9558 XE133 XENON 10MCI: HCPCS | Performed by: NURSE PRACTITIONER

## 2019-08-12 PROCEDURE — 93010 ELECTROCARDIOGRAM REPORT: CPT | Performed by: INTERNAL MEDICINE

## 2019-08-12 PROCEDURE — 1200000000 HC SEMI PRIVATE

## 2019-08-12 PROCEDURE — 93970 EXTREMITY STUDY: CPT

## 2019-08-12 PROCEDURE — 82570 ASSAY OF URINE CREATININE: CPT

## 2019-08-12 PROCEDURE — 87798 DETECT AGENT NOS DNA AMP: CPT

## 2019-08-12 PROCEDURE — 80048 BASIC METABOLIC PNL TOTAL CA: CPT

## 2019-08-12 PROCEDURE — 76770 US EXAM ABDO BACK WALL COMP: CPT

## 2019-08-12 PROCEDURE — 51798 US URINE CAPACITY MEASURE: CPT

## 2019-08-12 RX ORDER — DEXTROSE MONOHYDRATE 50 MG/ML
100 INJECTION, SOLUTION INTRAVENOUS PRN
Status: DISCONTINUED | OUTPATIENT
Start: 2019-08-12 | End: 2019-08-14 | Stop reason: HOSPADM

## 2019-08-12 RX ORDER — XENON XE-133 10 MCI/1
13 GAS RESPIRATORY (INHALATION)
Status: COMPLETED | OUTPATIENT
Start: 2019-08-12 | End: 2019-08-12

## 2019-08-12 RX ORDER — LINEZOLID 2 MG/ML
600 INJECTION, SOLUTION INTRAVENOUS EVERY 12 HOURS
Status: DISCONTINUED | OUTPATIENT
Start: 2019-08-12 | End: 2019-08-13

## 2019-08-12 RX ORDER — NICOTINE POLACRILEX 4 MG
15 LOZENGE BUCCAL PRN
Status: DISCONTINUED | OUTPATIENT
Start: 2019-08-12 | End: 2019-08-14 | Stop reason: HOSPADM

## 2019-08-12 RX ORDER — IPRATROPIUM BROMIDE AND ALBUTEROL SULFATE 2.5; .5 MG/3ML; MG/3ML
1 SOLUTION RESPIRATORY (INHALATION) EVERY 4 HOURS PRN
Status: DISCONTINUED | OUTPATIENT
Start: 2019-08-12 | End: 2019-08-14 | Stop reason: HOSPADM

## 2019-08-12 RX ORDER — DEXTROSE MONOHYDRATE 25 G/50ML
12.5 INJECTION, SOLUTION INTRAVENOUS PRN
Status: DISCONTINUED | OUTPATIENT
Start: 2019-08-12 | End: 2019-08-14 | Stop reason: HOSPADM

## 2019-08-12 RX ADMIN — INSULIN LISPRO 6 UNITS: 100 INJECTION, SOLUTION INTRAVENOUS; SUBCUTANEOUS at 13:02

## 2019-08-12 RX ADMIN — ACETAMINOPHEN 650 MG: 325 TABLET, FILM COATED ORAL at 20:55

## 2019-08-12 RX ADMIN — HEPARIN SODIUM 5000 UNITS: 5000 INJECTION INTRAVENOUS; SUBCUTANEOUS at 20:48

## 2019-08-12 RX ADMIN — ACETAMINOPHEN 650 MG: 325 TABLET, FILM COATED ORAL at 15:05

## 2019-08-12 RX ADMIN — INSULIN LISPRO 6 UNITS: 100 INJECTION, SOLUTION INTRAVENOUS; SUBCUTANEOUS at 02:32

## 2019-08-12 RX ADMIN — INSULIN LISPRO 2 UNITS: 100 INJECTION, SOLUTION INTRAVENOUS; SUBCUTANEOUS at 20:48

## 2019-08-12 RX ADMIN — CEFEPIME HYDROCHLORIDE 2 G: 2 INJECTION, POWDER, FOR SOLUTION INTRAVENOUS at 17:58

## 2019-08-12 RX ADMIN — HEPARIN SODIUM 5000 UNITS: 5000 INJECTION INTRAVENOUS; SUBCUTANEOUS at 08:55

## 2019-08-12 RX ADMIN — LINEZOLID 600 MG: 600 INJECTION, SOLUTION INTRAVENOUS at 15:01

## 2019-08-12 RX ADMIN — Medication 10 ML: at 20:48

## 2019-08-12 RX ADMIN — ASPIRIN 81 MG: 81 TABLET, COATED ORAL at 09:47

## 2019-08-12 RX ADMIN — Medication 10 ML: at 09:01

## 2019-08-12 RX ADMIN — INSULIN LISPRO 2 UNITS: 100 INJECTION, SOLUTION INTRAVENOUS; SUBCUTANEOUS at 17:49

## 2019-08-12 RX ADMIN — PRAVASTATIN SODIUM 40 MG: 20 TABLET ORAL at 20:48

## 2019-08-12 RX ADMIN — Medication 6 MILLICURIE: at 11:41

## 2019-08-12 RX ADMIN — CALCIUM CARBONATE-VITAMIN D TAB 500 MG-200 UNIT 1 TABLET: 500-200 TAB at 09:47

## 2019-08-12 RX ADMIN — XENON XE-133 13 MILLICURIE: 10 GAS RESPIRATORY (INHALATION) at 11:42

## 2019-08-12 RX ADMIN — INSULIN LISPRO 6 UNITS: 100 INJECTION, SOLUTION INTRAVENOUS; SUBCUTANEOUS at 08:55

## 2019-08-12 RX ADMIN — CALCIUM CARBONATE-VITAMIN D TAB 500 MG-200 UNIT 1 TABLET: 500-200 TAB at 20:48

## 2019-08-12 RX ADMIN — CHLORTHALIDONE 12.5 MG: 25 TABLET ORAL at 09:47

## 2019-08-12 ASSESSMENT — PAIN DESCRIPTION - ORIENTATION: ORIENTATION: RIGHT;LEFT;LOWER

## 2019-08-12 ASSESSMENT — ENCOUNTER SYMPTOMS
FACIAL SWELLING: 0
PHOTOPHOBIA: 0
BLOOD IN STOOL: 0
NAUSEA: 0
ABDOMINAL PAIN: 0
EYE DISCHARGE: 0
DIARRHEA: 0
COUGH: 0
CHOKING: 0
EYE REDNESS: 0
APNEA: 0
COLOR CHANGE: 0
CHEST TIGHTNESS: 0
TROUBLE SWALLOWING: 0
STRIDOR: 0
SHORTNESS OF BREATH: 0
RHINORRHEA: 0

## 2019-08-12 ASSESSMENT — PAIN SCALES - GENERAL
PAINLEVEL_OUTOF10: 0
PAINLEVEL_OUTOF10: 0
PAINLEVEL_OUTOF10: 4
PAINLEVEL_OUTOF10: 0
PAINLEVEL_OUTOF10: 0
PAINLEVEL_OUTOF10: 5
PAINLEVEL_OUTOF10: 0
PAINLEVEL_OUTOF10: 3
PAINLEVEL_OUTOF10: 0

## 2019-08-12 ASSESSMENT — PAIN DESCRIPTION - FREQUENCY: FREQUENCY: INTERMITTENT

## 2019-08-12 ASSESSMENT — PAIN DESCRIPTION - ONSET: ONSET: PROGRESSIVE

## 2019-08-12 ASSESSMENT — PAIN DESCRIPTION - PROGRESSION: CLINICAL_PROGRESSION: NOT CHANGED

## 2019-08-12 ASSESSMENT — PAIN DESCRIPTION - DESCRIPTORS: DESCRIPTORS: CRAMPING

## 2019-08-12 ASSESSMENT — PAIN - FUNCTIONAL ASSESSMENT: PAIN_FUNCTIONAL_ASSESSMENT: PREVENTS OR INTERFERES SOME ACTIVE ACTIVITIES AND ADLS

## 2019-08-12 ASSESSMENT — PAIN DESCRIPTION - PAIN TYPE: TYPE: ACUTE PAIN;CHRONIC PAIN

## 2019-08-12 ASSESSMENT — PAIN DESCRIPTION - LOCATION: LOCATION: LEG

## 2019-08-12 NOTE — CONSULTS
problems:    · Generalized weakness  · Subjective fever and chills  · Leg cramping  · Poor appetite for 2 days  · Hyponatremia  · Acute kidney injury  · Port-A-Cath malfunction status post fluoroscopy and catheter retrieval on 8/2/2019 at [facility]  · Poorly controlled type 2 diabetes mellitus  · CLL  · COPD  · On monthly IVIG  · Essential hypertension  · Mixed hyperlipidemia      Discussion:      I reviewed the image of her chest x-ray. I do not see any lung infiltrate. The patient also had a VQ scan done today. No evidence of pulmonary embolism. Small ventilation/perfusion mismatch in the right upper lobe. No consolidation on the chest x-ray again    She tells me that her symptoms have been going on since her Port-A-Cath fluoroscopy on 8/2/2019. Underlying bacteremia suspected. Plan:     Diagnostic Workup:    · Agree with blood cultures  · Will order nasal MRSA screen  · We will order respiratory film array viral PCR panel  · Continue to follow fever curve, WBC count and blood cultures  · Follow up on liverand renal functions closely    Antimicrobials:    · Will start IV linezolid empirically until Port-A-Cath associated bloodstream infection is ruled out  · Start IV linezolid 600 mg every 12 hours  · Will stop IV ceftriaxone and start IV cefepime 2 g every 24 hours  · No evidence of pneumonia. We will stop azithromycin  · Continue to monitor her vitals closely  · Cough and deep breathing exercises  · We will follow-up on the culture results and clinical progress and will make further recommendation accordingly  · Maintain good glycemic control  · DVT prophylaxis  · Discussed the above plan with patient and RN   · Discussed all above with Dr. Lary García    Drug Monitoring:    · Continue serial monitoring for antibiotic toxicity as follows: CBC, CMP*  · Continue to watch for following: new or worsening fever, hypotension, hives, lip swelling and redness or purulence at vascular access sites.     I/v access Management:    · Continue to monitor i.v access sites for erythema, induration, discharge or tenderness. · As always, continue efforts to minimizetubes/lines/drains as clinically appropriate to reduce chances of line associated infections. Patient education and counseling:      · The patient was educated in detailabout the side-effects of various antibiotics and things to watch for like new rashes, lip swelling, severe reaction, worsening diarrhea, break through fever etc.  · Discussed patient'scondition and what to expect. All of the patient's questions were addressed in a satisfactory manner and patient verbalized understanding all instructions. Risk of Complications/Morbidity: High     · Illness(es)/ Infection present that pose threat to life/bodily function. · There is potential for severe exacerbation of infection/side effects of treatment. · Therapy requires intensive monitoring for antimicrobial agent toxicity. Thank you for involving me in the care of your patient. I will continue to follow. If you have any additional questions, please do not hesitate to contact me. Subjective:       HPI: Jerri Whittaker is a 78 y.o. female patient, who was seen at the request of Dr. Kylie Briggs MD.    History was obtained from chart review and the patient. The patient was admitted on 8/11/2019. I have been consulted to see the patient for above mentioned reason(s). The patient has multiple medical comorbidities, and presented to the ER for generalized weakness. It has been going on since Friday. The patient was also having bilateral leg cramping. The patient has CLL and has a Port-A-Cath in place. Looks like the patient had a fluoroscopy done for left subclavian Port-A-Cath catheter malfunction on 8/2/2019 and the intravascular catheter was retrieved with a snare she is also on weekly IVIG. The patient was having weakness after the procedure.     The patient had a chest x-ray done in the ER which is concerning for a possible pneumonia. She was admitted. I have been asked to see the patient for further evaluation and management                   Past Medical History: All past medical history reviewed today. Past Medical History:   Diagnosis Date    CLL (chronic lymphocytic leukemia) (HCC)     COPD, severe (HCC)     Diabetes type 2, uncontrolled ([address]     Disease of blood and blood forming organ     H/O medication noncompliance     Does not take her insulin regularly    Heart murmur     Hyperlipidemia     Hypertension     Influenza A 16    Other disorders of kidney and ureter in diseases classified elsewhere     Pneumonia          Past Surgical History: All pastsurgical history was reviewed today. Past Surgical History:   Procedure Laterality Date    BRONCHOSCOPY  2016    CATARACT REMOVAL       SECTION         Social History:  All social andepidemiologic history was reviewed and updated by me today as needed. · Tobacco use:   reports that she quit smoking about 32 years ago. Her smoking use included cigarettes. She has a 6.00 pack-year smoking history. She has never used smokeless tobacco.  · Alcohol use:   reports that she drinks alcohol. · Currently lives in: [facility]  ·  reports that she does not use drugs. Family History: All family history was reviewed today. Problem Relation Age of Onset    Emphysema Father     Asthma Father     Heart Disease Sister     High Blood Pressure Sister     Kidney Disease Sister     Diabetes Sister     Hypertension Sister     Diabetes Mother     Hypertension Mother          Medications: All current and past medications were reviewed.     Medications Prior to Admission: Nutritional Supplements (ENSURE PLUS HN) LIQD, Take 1 Can by mouth daily chocolate  pravastatin (PRAVACHOL) 40 MG tablet, Take 1 tablet by mouth daily  chlorthalidone (HYGROTON) 25 MG tablet, One half tablet daily  ergocalciferol (ERGOCALCIFEROL) 03324 units capsule, Take 1 capsule by mouth once a week  insulin glargine (TOUJEO MAX SOLOSTAR) 300 UNIT/ML injection pen, Inject 18 Units into the skin nightly  Insulin Pen Needle (PEN NEEDLES 5/16\") 30G X 8 MM MISC, 1 each by Does not apply route daily  linagliptin (TRADJENTA) 5 MG tablet, Take 1 tablet by mouth daily  ibuprofen (ADVIL;MOTRIN) 400 MG tablet, One tab every 6 hours as needed  albuterol sulfate  (90 BASE) MCG/ACT inhaler, Inhale 2 puffs into the lungs every 6 hours as needed for Wheezing  Lancet Devices (EASY MINI EJECT LANCING DEVICE) MISC,   calcium-vitamin D (OSCAL-500) 500-200 MG-UNIT per tablet, Take 1 tablet by mouth 2 times daily  Multiple Vitamins-Minerals (MULTIVITAMIN PO), Take  by mouth. aspirin 81 MG EC tablet, Take 81 mg by mouth daily. Blood Pressure Monitoring (WebLayersE BP MONITOR) KIT, 1 applicator by Does not apply route daily  EASY TOUCH PEN NEEDLES 31G X 6 MM MISC, Inject 1 each into the skin daily     insulin lispro  0-12 Units Subcutaneous TID WC    insulin lispro  0-6 Units Subcutaneous Nightly    linezolid  600 mg Intravenous Q12H    cefepime  2 g Intravenous Q24H    sodium chloride flush  10 mL Intravenous 2 times per day    heparin (porcine)  5,000 Units Subcutaneous BID    aspirin  81 mg Oral Daily    calcium-vitamin D  1 tablet Oral BID    pravastatin  40 mg Oral Nightly    chlorthalidone  12.5 mg Oral Daily          REVIEW OF SYSTEMS:       Review of Systems   Constitutional: Positive for fatigue and fever. Negative for chills, diaphoresis and unexpected weight change. HENT: Negative for congestion, ear discharge, ear pain, facial swelling, hearing loss, rhinorrhea and trouble swallowing. Eyes: Negative for photophobia, discharge, redness and visual disturbance. Respiratory: Negative for apnea, cough, choking, chest tightness, shortness of breath and stridor.     Cardiovascular: Negative for chest subsegmental atelectasis and a developing infectious/inflammatory process. Possible small left pleural effusion. Osteopenia. Outside records:    Labs, Microbiology, Radiology and pertinent results from Care everywhere, if available, were reviewed as a part ofthe consultation.       Problem list:       Patient Active Problem List   Diagnosis Code    CLL (chronic lymphocytic leukemia) ([facility]) C91.90    Chronic lymphocytic leukemia (CLL), B-cell ([facility]) C91.10    Neutropenic fever ([facility]) D70.9, Z39.42    Complicated UTI (urinary tract infection) N39.0    Sepsis secondary to UTI ([facility] A41.9, N39.0    Diabetes type 2, uncontrolled ([facility] E11.65    Hyperlipidemia E78.5    Hyperglycemia R73.9    H/O medication noncompliance Z91.14    Fracture of multiple pubic rami ([facility]) S32.599A    Elevated serum alkaline phosphatase level R74.8    Dehydration E86.0    Encounter for care related to Port-a-Cath Z45.2    GELACIO (acute kidney injury) ([facility] N17.9    Essential hypertension I10    CAP (community acquired pneumonia) J18.9    Influenza B infection with Bronchopneumonia J11.08, J12.89    Port-A-Cath in place Z95.828    Hypokalemia E87.6    Type 2 diabetes mellitus without complication ([facility] X38.8    Pneumonia of left lower lobe due to infectious organism ([facility] J18.1    Acute cystitis with hematuria N30.01    Anemia associated with CLL D64.9    Pneumonia J18.9    HCAP (healthcare-associated pneumonia) J18.9    Acute respiratory failure with hypoxia ([facility]) J96.01    Chronic lymphocytic leukemia of B-cell type in remission ([facility]) C91.11    Septic shock ([facility]) A41.9, R65.21    Acute respiratory failure with hypoxia ([facility]) J96.01    Bacteremia R78.81    Infection due to Haemophilus influenzae A49.2    Severe sepsis ([facility]) A41.9, R65.20    Abnormal EKG R94.31    Empyema ([facility]) J86.9    Pleural effusion, left J90    Hypogammaglobulinemia ([facility]) D80.1    [facility] obstructive pulmonary disease [facility] J44.9    Recurrent infections B99.9    Prophylactic immunotherapy Z29.8    Hypoxia R09.02    Hx of chronic lymphocytic leukemia Z85.6    General weakness R53.1    Fever and chills R50.9    Hyponatremia E87.1    Leg cramps R25.2    Long-term current use of intravenous immunoglobulin (IVIG) Z79.899         Please note that this chart was generated using Dragon dictation software. Although every effort was made to ensure the accuracy of this automated transcription, some errors in transcription may have occurred inadvertently. If you may need any clarification, please do not hesitate to contact me through EPIC or at the phone number provided below with my electronic signature. Any pictures or media included in this note were obtained after taking informed verbal consent from the patient and with their approval to include those in the patient's medical record.       Jose Manuel Serna MD, MPH  8/12/2019, 12:28 PM  [facility]ious Disease   Office: [phone]  Fax: [phone]  Tuesday AM clinic:   [address], [place] [address]  Thursday AM clinic: [address] [facility]

## 2019-08-12 NOTE — PROGRESS NOTES
Advanced Care Planning Note. Purpose of Encounter: Advanced care planning in light of CLL  Parties In Attendance: Patient  Decisional Capacity: Yes  Subjective: Patient with BL thigh cramping  Objective: Cr 1.9  Goals of Care Determination: Patient wants full support (CPR, vent, surgery, HD, trach, PEG)  Plan:  IVF, IV Abx, ID/Onc/Renal consults, BL LE Doppler US  Code Status: Full code   Time spent on Advanced care Plannin minutes  Advanced Care Planning Documents: Completed advanced directives on chart, daughter is the POA.     Jared Herman MD  2019 3:37 PM

## 2019-08-12 NOTE — PROGRESS NOTES
[facility]    8/12/2019 11:29 PM        Name: Jerri Whittaker . Admitted: 8/11/2019  Primary Care Provider: JENNIFER Stephens CNP (Tel: [phone])    Brief Course:  77 yo F with history of CLL, CKD 3, HTN, severe COPD, UC DM 2 on insulin, on monthly IVIG came to ER with SOB and weakness. Admitted as inpatient for acute respiratory failure with hypoxia with possible port infection. CC: SOB, weakness    Subjective:  . Patient continues to complain of BL thigh cramps. Denies back pain. Remains on 3 L O2 via NC (not on home O2). No CP, HA or fevers. Denies cough. Has low grade temps.     Reviewed interval ancillary notes    Current Medications    ipratropium-albuterol (DUONEB) nebulizer solution 1 ampule Q4H PRN   insulin lispro (HUMALOG) injection pen 0-12 Units TID WC   insulin lispro (HUMALOG) injection pen 0-6 Units Nightly   glucose (GLUTOSE) 40 % oral gel 15 g PRN   dextrose 50 % IV solution PRN   glucagon (rDNA) injection 1 mg PRN   dextrose 5 % solution PRN   linezolid (ZYVOX) IVPB 600 mg Q12H   cefepime (MAXIPIME) 2 g IVPB minibag Q24H   insulin glargine (LANTUS) injection pen 18 Units Nightly   sodium chloride flush 0.9 % injection 10 mL 2 times per day   sodium chloride flush 0.9 % injection 10 mL PRN   magnesium hydroxide (MILK OF MAGNESIA) 400 MG/5ML suspension 30 mL Daily PRN   ondansetron (ZOFRAN) injection 4 mg Q6H PRN   potassium chloride (KLOR-CON M) extended release tablet 40 mEq PRN   Or    potassium bicarb-citric acid (EFFER-K) effervescent tablet 40 mEq PRN   Or    potassium chloride 10 mEq/100 mL IVPB (Peripheral Line) PRN   acetaminophen (TYLENOL) tablet 650 mg Q4H PRN   heparin (porcine) injection 5,000 Units BID   0.9 % sodium chloride infusion Continuous   albuterol sulfate  (90 Base) MCG/ACT inhaler 2 puff Q6H PRN   aspirin EC tablet 81 mg Daily calcium-vitamin D (OSCAL-500) 500-200 MG-UNIT per tablet 1 tablet BID   pravastatin (PRAVACHOL) tablet 40 mg Nightly       Objective:  /68   Pulse 83   Temp 97.6 °F (36.4 °C) (Oral)   Resp 18   Ht 5' 3\" (1.6 m)   Wt 115 lb (52.2 kg)   LMP  (LMP Unknown)   SpO2 92%   BMI 20.37 kg/m²     Intake/Output Summary (Last 24 hours) at 8/12/2019 2329  Last data filed at 8/12/2019 2042  Gross per 24 hour   Intake 1016 ml   Output 820 ml   Net 196 ml      Wt Readings from Last 3 Encounters:   08/11/19 115 lb (52.2 kg)   08/02/19 119 lb (54 kg)   03/21/19 117 lb (53.1 kg)       General appearance:  Appears comfortable, thin, NAD  Eyes: Sclera clear. Pupils equal.  ENT: Moist oral mucosa. Trachea midline, no adenopathy. Cardiovascular: Regular rhythm, normal S1, S2. No murmur. No edema in lower extremities  Respiratory: Not using accessory muscles. Good inspiratory effort. Clear to auscultation bilaterally, no wheeze or crackles. R chest port  GI: Abdomen soft, no tenderness, not distended, normal bowel sounds  Musculoskeletal: No cyanosis in digits, neck supple  Neurology: Grossly intact. No speech or motor deficits  Psych: Normal affect. Alert and oriented in time, place and person  Skin: Warm, dry, normal turgor  Extremity exam shows brisk capillary refill. Peripheral pulses are palpable in lower extremities     Labs and Tests:  CBC:   Recent Labs     08/11/19  1730 08/12/19  0435   WBC 10.7 9.4   HGB 14.5 12.9    135     BMP:    Recent Labs     08/11/19  1730 08/12/19  0435   * 129*   K 4.3 4.8   CL 96* 98*   CO2 15* 18*   BUN 65* 55*   CREATININE 2.2* 1.9*   GLUCOSE 164* 358*     Hepatic:   Recent Labs     08/11/19  1730   AST 26   ALT 14   BILITOT 0.6   ALKPHOS 114     VL Extremity Venous Bilateral         NM LUNG VENT/PERFUSION (VQ)   Final Result   Low probability for pulmonary embolus. Small ventilation perfusion mismatch   is noted in the right upper lobe.          XR CHEST STANDARD (2 VW)

## 2019-08-12 NOTE — CONSULTS
MD Ashish Ramos MD Kristy Moats, MD               Office: [phone]                      Fax: [phone]                   NEPHROLOGY INITIAL CONSULT NOTE:     PATIENT NAME: Federico Dupree  : 1940  MRN: [id number]  SERVICE DATE: 2019   SERVICE TIME: 4:29 PM    REASON FOR CONSULT: I am asked to see this patient in consultation for my opinion  regarding management of Acute Kidney Injury. My recommendations will be communicated by way of shared medical record.    REQUESTING PHYSICIAN: Dr Jaki Flores (as last seen by our group)   PRIMARY CARE PHYSICIAN: JENNIFER Garcia - CNP    IMPRESSION AND RECOMMENDATIONS:    GELACIO (on proteinuric CKD -3): Severe: Oligouric  BL SCR ~1.4-1.6 -----> 2.2   Estimated Creatinine Clearance: 20 mL/min (A) (based on SCr of 1.9 mg/dL (H)).    : Etiology of GELACIO - Likely early ischemic ATN / pre-renal     - other differentials: r/o GN and TI and obstruction w/ her history      : work up so far:   - check UA w/ microscopy fist.   - Renal imaging: Ultrasound - ordering :   - IVFs - NS at 75 cc/hr   - check bladder scan, if > 200 cc retention - insert byrd   - strict I/Os    Associated problems:   - Azotemia: moderate - follow w/ renal fx  - Electrolytes: K: wnl  Hyperphosphatemia will check   - Volume status: eu-volemic  Na: hyponatremia - chronic - pseudo- w/ IVIG   BP: HTN - controlled --- keep slightly higher  - Acid-Base: acidosis - non-AG - d/to GELACIO - follow   - Anemia: none     Other problems:  Patient Active Problem List   Diagnosis    CLL (chronic lymphocytic leukemia) (HCC)    Chronic lymphocytic leukemia (CLL), B-cell (HCC)    Neutropenic fever (HCC)    Complicated UTI (urinary tract infection)    Sepsis secondary to UTI ([address]    Diabetes type 2, uncontrolled [facility] [address]    Hyperlipidemia    Hyperglycemia    H/O medication noncompliance    Fracture of multiple pubic rami (HCC)    Elevated serum alkaline phosphatase level    Dehydration    Encounter for care related to Port-a-Cath    [facility]IO [facility]) [facility] [address]    Essential hypertension    CAP (community acquired pneumonia)    Influenza B infection with Bronchopneumonia    Port-A-Cath in place    Hypokalemia    Type 2 diabetes mellitus without complication ([facility] [address]    Pneumonia of left lower lobe due to infectious organism ([facility] [address]    Acute cystitis with hematuria    Anemia associated with CLL    Pneumonia    HCAP (healthcare-associated pneumonia)    Acute respiratory failure with hypoxia (HCC)    Chronic lymphocytic leukemia of B-cell type in remission ([facility] [address]    Septic shock (HCC)    Acute respiratory failure with hypoxia (HCC)    Bacteremia    Infection due to Haemophilus influenzae    Severe sepsis (HCC)    Abnormal EKG    Empyema (HCC)    Pleural effusion, left    Hypogammaglobulinemia (HCC)    Chronic obstructive pulmonary disease (HCC)    Recurrent infections    Prophylactic immunotherapy    Hypoxia    Hx of chronic lymphocytic leukemia    General weakness    Fever and chills    Hyponatremia    Leg cramps    Long-term current use of intravenous immunoglobulin (IVIG)   : other supportive care :   - Check daily renal function panel with electrolytes-phosphorus  - Strict monitoring of I/Os, daily weight  - Renal feeds/diet  - Current medications reviewed. - Nephrotoxic medications have been discontinued. - Dose adjusted and appropriate. - Dose meds for eGFR <15 mL/min/1.73m2 during GELACIO    - Avoid heavy opioids due to renal failure - may use very low dose dilaudid / fentanyl with close monitoring of CNS and respiratory depression. Please refer to the orders. High Complexity. Multiple complex problems. Discussed with patient, and treatment team- nurse   Thank you for allowing me to participate in this patient's care. Please do not hesitate to contact me with any questions/concerns. We will follow along with you. Ra Garcia MD       [facility] [address] [place] [address]  Office: [phone] or [address]  Fax: [phone]              CHIEF COMPLAINT:   Chief Complaint   Patient presents with    Fatigue     pt reports weakness progressing over the last few days. starting especially last night into today. jaime horses frequently. all started \"after they did something with my port\"     History Obtained From:  patient, electronic medical record    HPI: Ms. Curtis Shown is a 78 y.o. female with significant past medical history of   Past Medical History:   Diagnosis Date    [facility]) [facility] [address]     COPD, severe ([facility] [address]     Diabetes type 2, uncontrolled ([facility] [address]     Disease of blood and blood forming organ     H/O medication noncompliance     Does not take her insulin regularly    Heart murmur     Hyperlipidemia     Hypertension     Influenza A 2/13/16    Other disorders of kidney and ureter in diseases classified elsewhere     Pneumonia     ,   presents with Fatigue (pt reports weakness progressing over the last few days. starting especially last night into today. jaime horses frequently. all started \"after they did something with my port\")  in ER - found to be tachy, hypoxia, GELACIO on CKD. Needing IVF bolus, now on MIVFs. Currently being evaluated for hypoxic respiratory failure w/ HCPNA. Being seen by onc for h/o CLL and h/o Hypogammaglobulinemia  - not on chemo Tx currently. But does receive IVIG monthly, last dose 7/25  Regarding:  GELACIO  · Duration (when): acute   · Location (where): kidneys  · Severity: BL SCR ~1.4-1.6 -----> 2.2   · Context (ex: activity at onset or related to condition): dehydration + chlorthalidone + Ibuprofen   · Timing (ex: continuous, intermittent): continous  · Modifying factors (ex: medications, interventions): IVFs   · Associated signs & symptoms (ex: edema, SOB): As mentioned above in CC and HPI      Below mentioned Past medical / Surgical, Social, Family RESULTS BY ME:    Xr Chest Standard (2 Vw)    Result Date: 8/11/2019  EXAMINATION: TWO XRAY VIEWS OF THE CHEST 8/11/2019 5:19 pm COMPARISON: 03/17/2019. HISTORY: ORDERING SYSTEM PROVIDED HISTORY: weakness, dyspnea, hypoxia TECHNOLOGIST PROVIDED HISTORY: Reason for exam:->weakness, dyspnea, hypoxia Reason for Exam: INCREASE SHORTNESS OF BREATH. FORMER SMOKER. HISTORY OF CHRONIC LYMPHOCYTIC LEUKEMIA, COPD, DIABETES, HEART MURMUR, HTN Acuity: Acute Type of Exam: Initial FINDINGS: Frontal and lateral views of the chest.  Low left lung volume with mild elevation of left hemidiaphragm. Bilateral basilar heterogeneous opacities. Stable left subclavian port. Possible small left pleural effusion. No pneumothorax. Stable cardiomediastinal silhouette and great vessels with redemonstration of atherosclerotic thoracic aorta. Multilevel degenerative disc disease. Osteopenia. Low left lung volume with mild elevation of left hemidiaphragm. Bilateral basilar heterogeneous opacities. Differential considerations include subsegmental atelectasis and a developing infectious/inflammatory process. Possible small left pleural effusion. Osteopenia. Nm Lung Vent/perfusion (vq)    Result Date: 8/12/2019  EXAMINATION: NUCLEAR MEDICINE VENTILATION PERFUSION SCAN. 8/12/2019 TECHNIQUE: 13 millicuries  was administered prior to planar imaging of the lungs in the posterior projection. Then, 6 millicuries of Tc 84L MAA was administered intravenously prior to planar imaging of the lungs in multiple projections. COMPARISON: Chest radiograph 08/11/2019. HISTORY: ORDERING SYSTEM PROVIDED HISTORY: DYSPNEA, ON EXERTION TECHNOLOGIST PROVIDED HISTORY: Reason for Exam: dtspnea on exertion,sob Acuity: Unknown Type of Exam: Initial FINDINGS: PERFUSION: There is a small subsegmental perfusion mismatches in the right upper lobe. Otherwise no peripheral wedge-shaped defects are identified. VENTILATION: Ventilation images are unremarkable. CHEST RADIOGRAPH: No consolidation is evident on chest x-ray. Low probability for pulmonary embolus. Small ventilation perfusion mismatch is noted in the right upper lobe. Ir Repair Cvad W Sq Port/pump    Result Date: 8/2/2019  Portacatheter evaluation Portacatheter injection Retrieval of intravascular catheter with snare and catheter stripping Conscious sedation Fluoroscopy and ultrasound guidance INDICATIONS: Left subclavian portacatheter malfunction, no blood return Procedure performed by Dr. Man Albarran Risks and benefits discussed and informed consent obtained. CONSCIOUS SEDATION: Intravenous Versed and fentanyl with hemodynamic monitoring and pulse oximetry. Independent nursing observer: Incremental administration  of Versed and fentanyl Time started: 1418 Time completed: 1445 Post sedation exam: No complications, stable vital signs STERILE PREP: Full maximum sterile field/barrier technique was followed (with cap and mask, gloves and sterile gown, as well as broad field sterile drapes). Local disinfection was performed with broad field application of orange dyed chloraprep solution, which was allowed to dry fully prior to the initiation of the procedure. Maintain sterile field at all times. Ultrasound probe cleansing with sterile gel and probe covers were used. Medications labeled Initial portacatheter injection: Persistence with flushing. No blood return. PROCEDURE: Pulmonary evaluation of the right common femoral vein demonstrates vessel patency with coaptation of the vessel wall and respiratory variation. Targeted ultrasound of the right common femoral vein was uneventful. 6 Cameroonian sheath placement. Initially loops there was placed. However, catheter is epithelial as to the wall of the vena cava. 6 Cameroonian catheter, angled pigtail was placed superiorly above the catheter. Tip-deflecting wire was utilized.  The catheter was stripped from the lateral wall of the superior vena cava Subsequent catheter stripping with loop snare was performed multiple times Postprocedure patency with blood return and flushing of the portacatheter was confirmed. Catheter injection was also performed confirming patency No extravasation Fluoroscopy time 8.4 minute Ultrasound images: 1, sent to electronic record Complications: None Fluoroscopic images: 27     1. Adherence of the portacatheter to the lateral wall of the superior vena cava with additional fibrin sheath. 2. Uneventful retrieval of intravascular catheter and repositioning in the central lumen of the superior vena cava successful stripping.  3. Catheter is now functional and Patent and ready for use

## 2019-08-12 NOTE — PROGRESS NOTES
Patient arrived to room 5568 from ER. Patient oriented to room and call light system. Fall precautions in place, call light and belongings in reach, bed in lowest position, wheels locked in place, side rails up x 2, walkways free of clutter. Bed alarm on. Daughter at bedside and updated on plan of care.

## 2019-08-12 NOTE — PLAN OF CARE
Problem: Falls - Risk of:  Goal: Will remain free from falls  Description  Will remain free from falls  Outcome: Ongoing  Goal: Absence of physical injury  Description  Absence of physical injury  Outcome: Ongoing     Problem: Pain:  Goal: Pain level will decrease  Description  Pain level will decrease  Outcome: Ongoing  Goal: Control of acute pain  Description  Control of acute pain  Outcome: Ongoing  Goal: Control of chronic pain  Description  Control of chronic pain  Outcome: Ongoing  Assess risk factors for falls  Assess fall prevention measures  Assist ambulation and toileting    Problem: Airway Clearance - Ineffective:  Goal: Clear lung sounds  Description  Clear lung sounds  Outcome: Ongoing  Goal: Ability to maintain a clear airway will improve  Description  Ability to maintain a clear airway will improve  Outcome: Ongoing     Problem: Fluid Volume - Deficit:  Goal: Achieves intake and output within specified parameters  Description  Achieves intake and output within specified parameters  Outcome: Ongoing     Problem: Gas Exchange - Impaired:  Goal: Levels of oxygenation will improve  Description  Levels of oxygenation will improve  Outcome: Ongoing     Problem: Hyperthermia:  Goal: Ability to maintain a body temperature in the normal range will improve  Description  Ability to maintain a body temperature in the normal range will improve  Outcome: Ongoing     Problem: Metabolic:  Goal: Ability to maintain appropriate glucose levels will improve  Description  Ability to maintain appropriate glucose levels will improve  Outcome: Ongoing     Problem: Nutritional:  Goal: Maintenance of adequate nutrition will improve  Description  Maintenance of adequate nutrition will improve  Outcome: Ongoing     Problem: Physical Regulation:  Goal: Complications related to the disease process, condition or treatment will be avoided or minimized  Description  Complications related to the disease process, condition or treatment will be avoided or minimized  Outcome: Ongoing     Problem: Skin Integrity:  Goal: Risk for impaired skin integrity will decrease  Description  Risk for impaired skin integrity will decrease  Outcome: Ongoing  Assist with repositioning  Provide skin care    Problem: Tissue Perfusion - Renal, Altered:  Goal: Electrolytes within specified parameters  Description  Electrolytes within specified parameters  Outcome: Ongoing  Goal: Urine creatinine clearance will be within specified parameters  Description  Urine creatinine clearance will be within specified parameters  Outcome: Ongoing  Goal: Serum creatinine will be within specified parameters  Description  Serum creatinine will be within specified parameters  Outcome: Ongoing  Goal: Ability to achieve a balanced intake and output will improve  Description  Ability to achieve a balanced intake and output will improve  Outcome: Ongoing  The care plan and education has been reviewed and mutually agreed upon with the patient.

## 2019-08-12 NOTE — CONSULTS
[facility]   Progress Note      8/12/2019 3:03 PM        Name: Tan Peoples . Admitted: 8/11/2019    HPI:  Patient of Dr. Luis Miguel Hernández with a history of CLL, hypogammaglobulinemia. She received 6 cycles of Rituxan in 2014 and has been in remission. She receives monthly IVIG, last dose 7/25/19. IgG 6/27/19 was 1362. She is admitted with SOB, hypoxia and GELACIO. VQ scan low probability for PE. She is being treated for HCAP.        Reviewed interval ancillary notes    Current Medications    ipratropium-albuterol (DUONEB) nebulizer solution 1 ampule Q4H PRN   insulin lispro (HUMALOG) injection pen 0-12 Units TID WC   insulin lispro (HUMALOG) injection pen 0-6 Units Nightly   glucose (GLUTOSE) 40 % oral gel 15 g PRN   dextrose 50 % IV solution PRN   glucagon (rDNA) injection 1 mg PRN   dextrose 5 % solution PRN   linezolid (ZYVOX) IVPB 600 mg Q12H   cefepime (MAXIPIME) 2 g IVPB minibag Q24H   sodium chloride flush 0.9 % injection 10 mL 2 times per day   sodium chloride flush 0.9 % injection 10 mL PRN   magnesium hydroxide (MILK OF MAGNESIA) 400 MG/5ML suspension 30 mL Daily PRN   ondansetron (ZOFRAN) injection 4 mg Q6H PRN   potassium chloride (KLOR-CON M) extended release tablet 40 mEq PRN   Or    potassium bicarb-citric acid (EFFER-K) effervescent tablet 40 mEq PRN   Or    potassium chloride 10 mEq/100 mL IVPB (Peripheral Line) PRN   acetaminophen (TYLENOL) tablet 650 mg Q4H PRN   heparin (porcine) injection 5,000 Units BID   0.9 % sodium chloride infusion Continuous   albuterol sulfate  (90 Base) MCG/ACT inhaler 2 puff Q6H PRN   aspirin EC tablet 81 mg Daily   calcium-vitamin D (OSCAL-500) 500-200 MG-UNIT per tablet 1 tablet BID   pravastatin (PRAVACHOL) tablet 40 mg Nightly   chlorthalidone (HYGROTON) tablet 12.5 mg Daily       Objective:  BP (!) 152/73   Pulse 91   Temp 98.4 °F (36.9 °C) (Oral)   Resp 16   Ht 5' 3\" (1.6 m)   Wt 115 lb (52.2 kg)   LMP  (LMP Unknown)   SpO2 97%   BMI 20.37 kg/m²     Intake/Output Summary (Last 24 hours) at 8/12/2019 1503  Last data filed at 8/12/2019 1334  Gross per 24 hour   Intake 360 ml   Output 720 ml   Net -360 ml    Wt Readings from Last 3 Encounters:   08/11/19 115 lb (52.2 kg)   08/02/19 119 lb (54 kg)   03/21/19 117 lb (53.1 kg)       General appearance:  Appears comfortable  Eyes: Sclera clear. Pupils equal.  ENT: Moist oral mucosa. Trachea midline, no adenopathy. Cardiovascular: Regular rhythm, normal S1, S2. No murmur. No edema in lower extremities  Respiratory: Not using accessory muscles. Good inspiratory effort. Clear to auscultation bilaterally, no wheeze or crackles. GI: Abdomen soft, no tenderness, not distended  Musculoskeletal: No cyanosis in digits, neck supple  Neurology: CN 2-12 grossly intact. No speech or motor deficits  Psych: Normal affect. Alert and oriented in time, place and person  Skin: Warm, dry, normal turgor    Labs and Tests:  CBC:   Recent Labs     08/11/19  1730 08/12/19  0435   WBC 10.7 9.4   HGB 14.5 12.9    135     BMP:  Recent Labs     08/11/19  1730 08/12/19  0435   * 129*   K 4.3 4.8   CL 96* 98*   CO2 15* 18*   BUN 65* 55*   CREATININE 2.2* 1.9*   GLUCOSE 164* 358*     Hepatic: Recent Labs     08/11/19  1730   AST 26   ALT 14   BILITOT 0.6   ALKPHOS 114       ASSESSMENT AND PLAN    Principal Problem:    Hypoxia  Active Problems:    CLL (chronic lymphocytic leukemia) (HCC)    Diabetes type 2, uncontrolled (HCC)    Dehydration    GELACIO (acute kidney injury) (HCC)    Chronic obstructive pulmonary disease (HCC)    Hx of chronic lymphocytic leukemia    General weakness    Fever and chills    Hyponatremia    Leg cramps    Long-term current use of intravenous immunoglobulin (IVIG)  Resolved Problems:    * No resolved hospital problems.  *      CLL  Not currently on treatment  CBC WNL      Hypogammaglobulinemia   Receives IVIG monthly, last dose 7/25        Shalonda [place], [place]  [facility],

## 2019-08-12 NOTE — CARE COORDINATION
Discharge Planning Assessment  RN/SW discharge planner met with patient/(and family member) to discuss reason for admission, current living situation, and potential needs at the time of discharge    Demographics/Insurance verified Yes    Current type of dwelling: ranch style home w/basement    Living arrangements: w/family-2 daughters    Level of function/Support: pt stated independent w/all ADL's    PCP: Jillian    Last Visit to PCP: 6 mos    DME: walker-uses occasionally, cane uses mostly    Active with any community resources/agencies/skilled home care: pt stated not active any agencies    Medication compliance issues: stated compliant    Financial issues that could impact healthcare: stated no issues    Transportation at the time of discharge:family    Tentative discharge plan: Pt stated no needs anticipated on d/c at this time.     Electronically signed by SHERI Blanton on 8/12/2019 at 4:12 PM

## 2019-08-13 LAB
ANION GAP SERPL CALCULATED.3IONS-SCNC: 9 MMOL/L (ref 3–16)
BASOPHILS ABSOLUTE: 0 K/UL (ref 0–0.2)
BASOPHILS RELATIVE PERCENT: 0.1 %
BILIRUBIN URINE: NEGATIVE
BLOOD, URINE: ABNORMAL
BUN BLDV-MCNC: 39 MG/DL (ref 7–20)
CALCIUM SERPL-MCNC: 7.6 MG/DL (ref 8.3–10.6)
CHLORIDE BLD-SCNC: 103 MMOL/L (ref 99–110)
CHLORIDE URINE RANDOM: 42 MMOL/L
CLARITY: ABNORMAL
CO2: 20 MMOL/L (ref 21–32)
COLOR: YELLOW
CREAT SERPL-MCNC: 1.4 MG/DL (ref 0.6–1.2)
CREATININE URINE: 51.3 MG/DL (ref 28–259)
EOSINOPHILS ABSOLUTE: 0 K/UL (ref 0–0.6)
EOSINOPHILS RELATIVE PERCENT: 0.4 %
EPITHELIAL CELLS, UA: 1 /HPF (ref 0–5)
GFR AFRICAN AMERICAN: 44
GFR NON-AFRICAN AMERICAN: 36
GLUCOSE BLD-MCNC: 148 MG/DL (ref 70–99)
GLUCOSE BLD-MCNC: 173 MG/DL (ref 70–99)
GLUCOSE BLD-MCNC: 181 MG/DL (ref 70–99)
GLUCOSE BLD-MCNC: 206 MG/DL (ref 70–99)
GLUCOSE BLD-MCNC: 234 MG/DL (ref 70–99)
GLUCOSE BLD-MCNC: 98 MG/DL (ref 70–99)
GLUCOSE URINE: NEGATIVE MG/DL
HCT VFR BLD CALC: 33 % (ref 36–48)
HEMOGLOBIN: 11.2 G/DL (ref 12–16)
HYALINE CASTS: 4 /LPF (ref 0–8)
KETONES, URINE: NEGATIVE MG/DL
LEUKOCYTE ESTERASE, URINE: ABNORMAL
LYMPHOCYTES ABSOLUTE: 1.8 K/UL (ref 1–5.1)
LYMPHOCYTES RELATIVE PERCENT: 16.1 %
MCH RBC QN AUTO: 29.4 PG (ref 26–34)
MCHC RBC AUTO-ENTMCNC: 34 G/DL (ref 31–36)
MCV RBC AUTO: 86.6 FL (ref 80–100)
MICROSCOPIC EXAMINATION: YES
MONOCYTES ABSOLUTE: 0.5 K/UL (ref 0–1.3)
MONOCYTES RELATIVE PERCENT: 4.8 %
MRSA SCREEN RT-PCR: NORMAL
NEUTROPHILS ABSOLUTE: 8.7 K/UL (ref 1.7–7.7)
NEUTROPHILS RELATIVE PERCENT: 78.6 %
NITRITE, URINE: NEGATIVE
PDW BLD-RTO: 13.7 % (ref 12.4–15.4)
PERFORMED ON: ABNORMAL
PERFORMED ON: NORMAL
PH UA: 6 (ref 5–8)
PHOSPHORUS: 1.4 MG/DL (ref 2.5–4.9)
PLATELET # BLD: 137 K/UL (ref 135–450)
PMV BLD AUTO: 8.6 FL (ref 5–10.5)
POTASSIUM SERPL-SCNC: 4 MMOL/L (ref 3.5–5.1)
POTASSIUM, UR: 17.8 MMOL/L
PROTEIN UA: ABNORMAL MG/DL
RBC # BLD: 3.81 M/UL (ref 4–5.2)
RBC UA: 10 /HPF (ref 0–4)
SODIUM BLD-SCNC: 132 MMOL/L (ref 136–145)
SODIUM URINE: 42 MMOL/L
SPECIFIC GRAVITY UA: 1.01 (ref 1–1.03)
TSH REFLEX: 2.29 UIU/ML (ref 0.27–4.2)
UREA NITROGEN, UR: 497.7 MG/DL (ref 800–1666)
UROBILINOGEN, URINE: 0.2 E.U./DL
VITAMIN B-12: >2000 PG/ML (ref 211–911)
WBC # BLD: 11 K/UL (ref 4–11)
WBC UA: 198 /HPF (ref 0–5)

## 2019-08-13 PROCEDURE — 6370000000 HC RX 637 (ALT 250 FOR IP): Performed by: NURSE PRACTITIONER

## 2019-08-13 PROCEDURE — 2580000003 HC RX 258: Performed by: NURSE PRACTITIONER

## 2019-08-13 PROCEDURE — 97116 GAIT TRAINING THERAPY: CPT

## 2019-08-13 PROCEDURE — 51798 US URINE CAPACITY MEASURE: CPT

## 2019-08-13 PROCEDURE — 81001 URINALYSIS AUTO W/SCOPE: CPT

## 2019-08-13 PROCEDURE — 84100 ASSAY OF PHOSPHORUS: CPT

## 2019-08-13 PROCEDURE — 6360000002 HC RX W HCPCS: Performed by: INTERNAL MEDICINE

## 2019-08-13 PROCEDURE — 99233 SBSQ HOSP IP/OBS HIGH 50: CPT | Performed by: INTERNAL MEDICINE

## 2019-08-13 PROCEDURE — 96366 THER/PROPH/DIAG IV INF ADDON: CPT

## 2019-08-13 PROCEDURE — 97161 PT EVAL LOW COMPLEX 20 MIN: CPT

## 2019-08-13 PROCEDURE — 6370000000 HC RX 637 (ALT 250 FOR IP): Performed by: INTERNAL MEDICINE

## 2019-08-13 PROCEDURE — 97165 OT EVAL LOW COMPLEX 30 MIN: CPT

## 2019-08-13 PROCEDURE — 87086 URINE CULTURE/COLONY COUNT: CPT

## 2019-08-13 PROCEDURE — 1200000000 HC SEMI PRIVATE

## 2019-08-13 PROCEDURE — 96372 THER/PROPH/DIAG INJ SC/IM: CPT

## 2019-08-13 PROCEDURE — 85025 COMPLETE CBC W/AUTO DIFF WBC: CPT

## 2019-08-13 PROCEDURE — 97530 THERAPEUTIC ACTIVITIES: CPT

## 2019-08-13 PROCEDURE — 80048 BASIC METABOLIC PNL TOTAL CA: CPT

## 2019-08-13 PROCEDURE — 6360000002 HC RX W HCPCS: Performed by: NURSE PRACTITIONER

## 2019-08-13 RX ORDER — TRAMADOL HYDROCHLORIDE 50 MG/1
50 TABLET ORAL EVERY 6 HOURS PRN
Status: DISCONTINUED | OUTPATIENT
Start: 2019-08-13 | End: 2019-08-14 | Stop reason: HOSPADM

## 2019-08-13 RX ORDER — MORPHINE SULFATE 4 MG/ML
4 INJECTION, SOLUTION INTRAMUSCULAR; INTRAVENOUS EVERY 4 HOURS PRN
Status: DISCONTINUED | OUTPATIENT
Start: 2019-08-13 | End: 2019-08-14 | Stop reason: HOSPADM

## 2019-08-13 RX ADMIN — INSULIN LISPRO 2 UNITS: 100 INJECTION, SOLUTION INTRAVENOUS; SUBCUTANEOUS at 12:59

## 2019-08-13 RX ADMIN — CALCIUM CARBONATE-VITAMIN D TAB 500 MG-200 UNIT 1 TABLET: 500-200 TAB at 09:44

## 2019-08-13 RX ADMIN — CALCIUM CARBONATE-VITAMIN D TAB 500 MG-200 UNIT 1 TABLET: 500-200 TAB at 22:25

## 2019-08-13 RX ADMIN — Medication 10 ML: at 22:34

## 2019-08-13 RX ADMIN — ONDANSETRON 4 MG: 2 INJECTION INTRAMUSCULAR; INTRAVENOUS at 11:35

## 2019-08-13 RX ADMIN — ASPIRIN 81 MG: 81 TABLET, COATED ORAL at 09:44

## 2019-08-13 RX ADMIN — SODIUM CHLORIDE: 9 INJECTION, SOLUTION INTRAVENOUS at 21:15

## 2019-08-13 RX ADMIN — HEPARIN SODIUM 5000 UNITS: 5000 INJECTION INTRAVENOUS; SUBCUTANEOUS at 22:30

## 2019-08-13 RX ADMIN — HEPARIN SODIUM 5000 UNITS: 5000 INJECTION INTRAVENOUS; SUBCUTANEOUS at 09:44

## 2019-08-13 RX ADMIN — INSULIN GLARGINE 18 UNITS: 100 INJECTION, SOLUTION SUBCUTANEOUS at 01:08

## 2019-08-13 RX ADMIN — ACETAMINOPHEN 650 MG: 325 TABLET, FILM COATED ORAL at 01:08

## 2019-08-13 RX ADMIN — LINEZOLID 600 MG: 600 INJECTION, SOLUTION INTRAVENOUS at 02:57

## 2019-08-13 RX ADMIN — INSULIN LISPRO 4 UNITS: 100 INJECTION, SOLUTION INTRAVENOUS; SUBCUTANEOUS at 10:21

## 2019-08-13 RX ADMIN — TRAMADOL HYDROCHLORIDE 50 MG: 50 TABLET, FILM COATED ORAL at 04:41

## 2019-08-13 RX ADMIN — Medication 10 ML: at 10:08

## 2019-08-13 RX ADMIN — SODIUM CHLORIDE: 9 INJECTION, SOLUTION INTRAVENOUS at 07:33

## 2019-08-13 RX ADMIN — INSULIN LISPRO 2 UNITS: 100 INJECTION, SOLUTION INTRAVENOUS; SUBCUTANEOUS at 17:19

## 2019-08-13 RX ADMIN — PRAVASTATIN SODIUM 40 MG: 20 TABLET ORAL at 22:25

## 2019-08-13 ASSESSMENT — PAIN DESCRIPTION - ONSET
ONSET: PROGRESSIVE

## 2019-08-13 ASSESSMENT — PAIN SCALES - GENERAL
PAINLEVEL_OUTOF10: 0
PAINLEVEL_OUTOF10: 3
PAINLEVEL_OUTOF10: 6
PAINLEVEL_OUTOF10: 0
PAINLEVEL_OUTOF10: 0
PAINLEVEL_OUTOF10: 10
PAINLEVEL_OUTOF10: 0
PAINLEVEL_OUTOF10: 5

## 2019-08-13 ASSESSMENT — ENCOUNTER SYMPTOMS
EYE DISCHARGE: 0
COLOR CHANGE: 0
RHINORRHEA: 0
STRIDOR: 0
PHOTOPHOBIA: 0
COUGH: 0
NAUSEA: 0
SHORTNESS OF BREATH: 0
EYE REDNESS: 0
FACIAL SWELLING: 0
CHOKING: 0
ABDOMINAL PAIN: 0
CHEST TIGHTNESS: 0
TROUBLE SWALLOWING: 0
DIARRHEA: 0
BLOOD IN STOOL: 0
APNEA: 0

## 2019-08-13 ASSESSMENT — PAIN DESCRIPTION - FREQUENCY
FREQUENCY: INTERMITTENT

## 2019-08-13 ASSESSMENT — PAIN DESCRIPTION - PROGRESSION
CLINICAL_PROGRESSION: NOT CHANGED

## 2019-08-13 ASSESSMENT — PAIN DESCRIPTION - PAIN TYPE
TYPE: CHRONIC PAIN;ACUTE PAIN
TYPE: CHRONIC PAIN
TYPE: ACUTE PAIN;CHRONIC PAIN
TYPE: CHRONIC PAIN

## 2019-08-13 ASSESSMENT — PAIN DESCRIPTION - DESCRIPTORS
DESCRIPTORS: CRAMPING

## 2019-08-13 ASSESSMENT — PAIN DESCRIPTION - ORIENTATION
ORIENTATION: RIGHT;LEFT;LOWER

## 2019-08-13 ASSESSMENT — PAIN DESCRIPTION - LOCATION
LOCATION: LEG

## 2019-08-13 ASSESSMENT — PAIN - FUNCTIONAL ASSESSMENT
PAIN_FUNCTIONAL_ASSESSMENT: PREVENTS OR INTERFERES SOME ACTIVE ACTIVITIES AND ADLS
PAIN_FUNCTIONAL_ASSESSMENT: PREVENTS OR INTERFERES SOME ACTIVE ACTIVITIES AND ADLS
PAIN_FUNCTIONAL_ASSESSMENT: ACTIVITIES ARE NOT PREVENTED
PAIN_FUNCTIONAL_ASSESSMENT: PREVENTS OR INTERFERES SOME ACTIVE ACTIVITIES AND ADLS

## 2019-08-13 NOTE — CARE COORDINATION
Met with pt this date and Pt agreeable to [facility] and has no preference.   Voice mail to Breann with [facility] regarding new referral.    Gosia Huff MSW, [place]  [phone]

## 2019-08-13 NOTE — PROGRESS NOTES
Pt. Routine VSS - see flowsheets. Pt. Re-attached to wall O2 therapy upon return from ultrasound; pt weaned to 2L per NC per wall O2; pt tolerating well. Pt informed of new contact precautions; pt verbalized understanding. Pt. Denies other needs at this time; pt shows no signs of discomfort or distress. Call light/table in reach. fall precautions in place. Will continue to monitor.

## 2019-08-13 NOTE — PROGRESS NOTES
conservation   Safety Devices  Safety Devices in place: Yes  Type of devices: All fall risk precautions in place;Nurse notified; Patient at risk for falls;Call light within reach; Left in chair;Chair alarm in place           Patient Diagnosis(es): The primary encounter diagnosis was Hypoxia. A diagnosis of Dehydration was also pertinent to this visit. has a past medical history of CLL (chronic lymphocytic leukemia) ([facility] [address] COPD, severe ([facility] [address] Diabetes type 2, uncontrolled ([facility] [address] Disease of blood and blood forming organ, H/O medication noncompliance, Heart murmur, Hyperlipidemia, Hypertension, Influenza A, Other disorders of kidney and ureter in diseases classified elsewhere, and Pneumonia. has a past surgical history that includes  section; Cataract removal; and bronchoscopy (2016). Treatment Diagnosis: pt presents with decreased endurance, ADL/IADL status and balance secondary to hypoxia/parainfluenza      Restrictions  Restrictions/Precautions  Restrictions/Precautions: Fall Risk, Contact Precautions(high fall risk, per MD droplet precautions for parainfluenza)  Position Activity Restriction  Other position/activity restrictions: 78 y.o. female with PMHx of CLL in remission, COPD, (does not wear O2), DM Type 2, HLT, HTN,  presented to [facility] with granddaughter at bedside c/o weakness, decreased appetite, SOB and difficulty breathing for \"awhile. 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 Ayo Lewis, hem/onc.      Subjective   General  Chart Reviewed: Yes  Family / Caregiver Present: No  Diagnosis: hypoxia  Subjective  Subjective: pt supine upon arrival and agreeable to OT eval, denies pain   Patient Currently in Pain: Denies  Pain Assessment  Pain Assessment: 0-10  Pain Level: 0  Patient's Stated Pain Goal: No pain  Patient Currently in Pain: Denies  O2 Device: None (Room air)  Social/Functional History  Social/Functional History  Lives With: Daughter(2 Daughters)  Type of Home: House  Home Layout: One level, Laundry in basement  Home Access: Stairs to enter with rails  Entrance Stairs - Number of Steps: 3 ARIEL  Entrance Stairs - Rails: Right  Bathroom Shower/Tub: Tub/Shower unit  Bathroom Toilet: Standard  Bathroom Accessibility: Walker accessible  Home Equipment: Cane(fOR LONGER DISTANCES)  ADL Assistance: Independent  Homemaking Assistance: Needs assistance(50% WITH DAUGHTERS)  Ambulation Assistance: Independent  Transfer Assistance: Independent  Active : No  Patient's  Info: daughters drive  Occupation: Retired  Leisure & Hobbies: puzzles  Additional Comments: no falls within the past 6 months       Objective        Orientation  Overall Orientation Status: Within Normal Limits  Observation/Palpation  Posture: Fair  Observation: rounded shoulders  Balance  Sitting Balance: Supervision  Standing Balance: Contact guard assistance  Standing Balance  Time: ~5-6 minutes total  Activity: functional mobility in room, pt desatting to 85%, seated for rest break secondary to reporting B LE weakness  Comment: no device used  Functional Mobility  Functional - Mobility Device: No device  Activity: Other  Assist Level: Contact guard assistance  Functional Mobility Comments: ~70 ft total   ADL  Additional Comments: pt declined ADLs this date  Tone RUE  RUE Tone: Normotonic  Tone LUE  LUE Tone: Normotonic  Coordination  Movements Are Fluid And Coordinated: Yes     Bed mobility  Supine to Sit: Stand by assistance  Scooting: Stand by assistance  Comment: HOB flat, pt intitially dizzy upon sitting/first stance EOB. BP 1002/64 sitting, in stance 100/64.  SPO2 at 90%  Transfers  Sit to stand: Stand by assistance  Stand to sit: Stand by assistance  Transfer Comments: EOB>recliner     Cognition  Overall Cognitive Status: [facility]  Perception  Overall Perceptual Status:

## 2019-08-13 NOTE — PROGRESS NOTES
Pt. Routine VSS - see flowsheets. Pt. Bladder scanned post-void - see flowsheets; pt states she does not feel pressure from small retention but does express urinary urgency; will notify MD. Pt. Denies other needs at this time; pt shows no signs of discomfort or distress. Call light/table in reach. Fall precautions in place. Will continue to monitor.

## 2019-08-13 NOTE — PROGRESS NOTES
Infectious Diseases   Progress Note      Admission Date: 8/11/2019  Hospital Day: Hospital Day: 3  Attending: Luis Carlos Ospina MD  Date of service: 8/13/2019    Chief complaint/ Reason for consult: The patient was seen today for the following:    · Generalized weakness  · Subjective fever and chills due to parainfluenza 1 infection  · Leg cramping  · Poor appetite for 2 days  · Hyponatremia  · Acute kidney injury    Microbiology:        I have reviewed all available micro lab data and cultures    · Blood culture (2/2) - collected on 8/11/2019: In process    Antibiotics and immunizations:       Current antibiotics: All antibiotics and their doses were reviewed by me    Recent Abx Admin                   linezolid (ZYVOX) IVPB 600 mg (mg) 600 mg New Bag 08/13/19 0257     600 mg New Bag 08/12/19 1501    cefepime (MAXIPIME) 2 g IVPB minibag (g) 2 g New Bag 08/12/19 1758                  Immunization History: All immunization history was reviewed by me today. Immunization History   Administered Date(s) Administered    Influenza Virus Vaccine 02/16/2016, 10/12/2017    Influenza, High Dose (Fluzone 65 yrs and older) 11/29/2018    Influenza, Bethena Romina, 3 yrs and older, IM, PF (Fluzone 3 yrs and older or Afluria 5 yrs and older) 09/27/2016    Pneumococcal Conjugate 13-valent ([id number]) 06/07/2018    Pneumococcal Polysaccharide ([id number]) 04/26/2010, 02/16/2016    Zoster Live (Zostavax) 08/11/2017       Known drug allergies: All allergies were reviewed and updated    No Known Allergies      Assessment:     The patient is a 78 y.o. old female who  has a past medical history of CLL (chronic lymphocytic leukemia) ([facility] [address] COPD, severe ([address] Diabetes type 2, uncontrolled ([address] Disease of blood and blood forming organ, H/O medication noncompliance, Heart murmur, Hyperlipidemia, Hypertension, Influenza A (2/13/16), Other disorders of kidney and ureter in diseases classified elsewhere, and Pneumonia.  with following appropriate to reduce chances of line associated infections. Patient education and counseling:     · The patient was educated in detail about the side-effects of various antibiotics and things to watch for like new rashes, lip swelling, severe reaction, worsening diarrhea, break through fever etc.  · Discussed patient's condition and what to expect. All of the patient's questions were addressed in a satisfactory manner and patient verbalized understanding all instructions. TIME SPENT TODAY:     - Spent over  36  minutes on visit (including interval history, physical exam, review of data including labs, cultures, imaging, development and implementation of treatment plan and coordination of complex care). - Over 50% of time spent with patient face to face on counseling and education. Thank you for involving me in the care of your patient. I will continue to follow. If you have any additional questions, please do not hesitate to contact me. Subjective: Interval history: Patient was seen and examined at bedside. Interval history was obtained. The patient appears tired. Seems to be tolerating antibiotics okay. No fever     REVIEW OF SYSTEMS:      Review of Systems   Constitutional: Positive for fatigue. Negative for chills, diaphoresis and unexpected weight change. HENT: Negative for congestion, ear discharge, ear pain, facial swelling, hearing loss, rhinorrhea and trouble swallowing. Eyes: Negative for photophobia, discharge, redness and visual disturbance. Respiratory: Negative for apnea, cough, choking, chest tightness, shortness of breath and stridor. Cardiovascular: Negative for chest pain and palpitations. Gastrointestinal: Negative for abdominal pain, blood in stool, diarrhea and nausea. Endocrine: Negative for polydipsia, polyphagia and polyuria. Genitourinary: Negative for difficulty urinating, dysuria, frequency, hematuria, menstrual problem and vaginal discharge. Musculoskeletal: Positive for myalgias. Negative for arthralgias, joint swelling and neck stiffness. Skin: Negative for color change and rash. Allergic/Immunologic: Negative for immunocompromised state. Neurological: Negative for dizziness, seizures, speech difficulty, light-headedness and headaches. Hematological: Negative for adenopathy. Psychiatric/Behavioral: Negative for agitation, hallucinations and suicidal ideas. Past Medical History: All past medical history reviewed today. Past Medical History:   Diagnosis Date    CLL (chronic lymphocytic leukemia) (HCC)     COPD, severe (HCC)     Diabetes type 2, uncontrolled ([facility]     Disease of blood and blood forming organ     H/O medication noncompliance     Does not take her insulin regularly    Heart murmur     Hyperlipidemia     Hypertension     Influenza A 16    Other disorders of kidney and ureter in diseases classified elsewhere     Pneumonia        Past Surgical History: All past surgical history was reviewed today. Past Surgical History:   Procedure Laterality Date    BRONCHOSCOPY  2016    CATARACT REMOVAL       SECTION           Immunization History: All immunization history was reviewed by me today. Immunization History   Administered Date(s) Administered    Influenza Virus Vaccine 2016, 10/12/2017    Influenza, High Dose (Fluzone 65 yrs and older) 2018    Influenza, Maya Mimes, 3 yrs and older, IM, PF (Fluzone 3 yrs and older or Afluria 5 yrs and older) 2016    Pneumococcal Conjugate 13-valent ([id number]) 2018    Pneumococcal Polysaccharide ([id number]) 2010, 2016    Zoster Live (Zostavax) 2017       Family History: All family history was reviewed today.         Problem Relation Age of Onset    Emphysema Father     Asthma Father     Heart Disease Sister     High Blood Pressure Sister     Kidney Disease Sister     Diabetes Sister     Hypertension Labs     08/11/19  1730 08/12/19  0435 08/13/19  0450   WBC 10.7 9.4 11.0   RBC 4.95 4.42 3.81*   HGB 14.5 12.9 11.2*   HCT 43.0 38.5 33.0*    135 137   MCV 86.8 87.2 86.6   MCH 29.3 29.2 29.4   MCHC 33.7 33.5 34.0   RDW 13.9 14.0 13.7        BMP:  Recent Labs     08/11/19  1730 08/12/19  0435 08/13/19  0450   * 129* 132*   K 4.3 4.8 4.0   CL 96* 98* 103   CO2 15* 18* 20*   BUN 65* 55* 39*   CREATININE 2.2* 1.9* 1.4*   CALCIUM 8.7 7.6* 7.6*   GLUCOSE 164* 358* 234*        Hepatic Function Panel:   Lab Results   Component Value Date    ALKPHOS 114 08/11/2019    ALT 14 08/11/2019    AST 26 08/11/2019    PROT 8.0 08/11/2019    PROT 6.2 08/03/2017    BILITOT 0.6 08/11/2019    BILIDIR 0.8 12/25/2016    IBILI 0.4 12/25/2016    LABALBU 4.3 08/11/2019       CPK: No results found for: CKTOTAL  ESR:   Lab Results   Component Value Date    SEDRATE 6 01/14/2016     CRP: No results found for: CRP        Imaging: All pertinent images and reports for the current visit were reviewed by me during this visit. US RENAL COMPLETE   Final Result   Mild bilateral hydronephrosis. Bilateral renal hyperechogenicity, which may suggest medical renal disease. VL Extremity Venous Bilateral         NM LUNG VENT/PERFUSION (VQ)   Final Result   Low probability for pulmonary embolus. Small ventilation perfusion mismatch   is noted in the right upper lobe. XR CHEST STANDARD (2 VW)   Final Result   Low left lung volume with mild elevation of left hemidiaphragm. Bilateral   basilar heterogeneous opacities. Differential considerations include   subsegmental atelectasis and a developing infectious/inflammatory process. Possible small left pleural effusion. Osteopenia. Medications: All current and past medications were reviewed.      insulin glargine  24 Units Subcutaneous Nightly    insulin lispro  0-12 Units Subcutaneous TID WC    insulin lispro  0-6 Units Subcutaneous Nightly    linezolid  600 mg Intravenous Q12H    cefepime  2 g Intravenous Q24H    sodium chloride flush  10 mL Intravenous 2 times per day    heparin (porcine)  5,000 Units Subcutaneous BID    aspirin  81 mg Oral Daily    calcium-vitamin D  1 tablet Oral BID    pravastatin  40 mg Oral Nightly        dextrose      sodium chloride 75 mL/hr at 08/13/19 0930       morphine, traMADol, ipratropium-albuterol, glucose, dextrose, glucagon (rDNA), dextrose, sodium chloride flush, magnesium hydroxide, ondansetron, potassium chloride **OR** potassium alternative oral replacement **OR** potassium chloride, acetaminophen, albuterol sulfate HFA      Problem list:       Patient Active Problem List   Diagnosis Code    CLL (chronic lymphocytic leukemia) ([facility]) C91.90    Chronic lymphocytic leukemia (CLL), B-cell ([facility]) C91.10    Neutropenic fever ([facility]) D70.9, G49.58    Complicated UTI (urinary tract infection) N39.0    Sepsis secondary to UTI ([facility] [address] A41.9, N39.0    Diabetes type 2, uncontrolled ([address] E11.65    Hyperlipidemia E78.5    Hyperglycemia R73.9    H/O medication noncompliance Z91.14    Fracture of multiple pubic rami ([facility]) S32.599A    Elevated serum alkaline phosphatase level R74.8    Dehydration E86.0    Encounter for care related to Port-a-Cath Z45.2    GELACIO (acute kidney injury) ([address] N17.9    Essential hypertension I10    CAP (community acquired pneumonia) J18.9    Influenza B infection with Bronchopneumonia J11.08, J12.89    Port-A-Cath in place Z95.828    Hypokalemia E87.6    Type 2 diabetes mellitus without complication ([address] Z74.8    Pneumonia of left lower lobe due to infectious organism ([address] J18.1    Acute cystitis with hematuria N30.01    Anemia associated with CLL D64.9    Pneumonia J18.9    HCAP (healthcare-associated pneumonia) J18.9    Acute respiratory failure with hypoxia ([address] J96.01    Chronic lymphocytic leukemia of B-cell type in remission ([facility] [address] C91.11    Septic shock ([facility]) A41.9,

## 2019-08-13 NOTE — DISCHARGE INSTR - COC
Continuity of Care Form    Patient Name: Tan Peoples   :  1940  MRN:  [id number]    Admit date:  2019  Discharge date:  19    Code Status Order: Full Code   Advance Directives:   [facility] Documentation     Date/Time Healthcare Directive Type of Healthcare Directive Copy in [address] Agent's Name Healthcare Agent's Phone Number    19 [id number]  No, patient does not have an advance directive for healthcare treatment -- -- -- -- --          Admitting Physician:  Kev Mosley MD  PCP: [id number] [facility], APRN - [facility]    Discharging Nurse: Zeke King 23 Unit/Room#: [id number]-3710/8440-63  Discharging Unit Phone Number: [phone]    Emergency Contact:   Extended Emergency Contact Information  Primary Emergency Contact: [address] Phone: [phone]  Relation: Child  Secondary Emergency Contact: Cristine Langford   [address] Phone: [phone]  Relation: Child    Past Surgical History:  Past Surgical History:   Procedure Laterality Date    BRONCHOSCOPY  2016    CATARACT REMOVAL       SECTION         Immunization History:   Immunization History   Administered Date(s) Administered    Influenza Virus Vaccine 2016, 10/12/2017    Influenza, High Dose (Fluzone 72 yrs and older) 2018    Influenza, [place], 3 yrs and older, IM, PF (Fluzone 3 yrs and older or Afluria 5 yrs and older) 2016    Pneumococcal Conjugate 13-valent ([id number]) 2018    Pneumococcal Polysaccharide (Aiggoxpwb64) 2010, 2016    Zoster Live (Zostavax) 2017       Active Problems:  Patient Active Problem List   Diagnosis Code    CLL (chronic lymphocytic leukemia) ([facility] [address] C91.90    Chronic lymphocytic leukemia (CLL), B-cell ([facility] [address] C91.10    Neutropenic fever ([facility] [address] D70.9, N93.76    Complicated UTI (urinary tract infection) N39.0    Sepsis secondary to UTI ([facility] [address] A41.9, N39.0    Diabetes type 2, uncontrolled ([facility] E11.65    Hyperlipidemia E78.5    Hyperglycemia R73.9    H/O medication noncompliance Z91.14    Fracture of multiple pubic rami ([facility]) S32.599A    Elevated serum alkaline phosphatase level R74.8    Dehydration E86.0    Encounter for care related to Port-a-Cath Z45.2    GELACIO (acute kidney injury) ([facility] N17.9    Essential hypertension I10    CAP (community acquired pneumonia) J18.9    Influenza B infection with Bronchopneumonia J11.08, J12.89    Port-A-Cath in place Z95.828    Hypokalemia E87.6    Type 2 diabetes mellitus without complication ([facility]) M59.1    Pneumonia of left lower lobe due to infectious organism ([facility] J18.1    Acute cystitis with hematuria N30.01    Anemia associated with CLL D64.9    Pneumonia J18.9    HCAP (healthcare-associated pneumonia) J18.9    Acute respiratory failure with hypoxia ([facility]) J96.01    Chronic lymphocytic leukemia of B-cell type in remission ([facility]) C91.11    Septic shock ([facility]) A41.9, R65.21    Acute respiratory failure with hypoxia ([facility]) J96.01    Bacteremia R78.81    Infection due to Haemophilus influenzae A49.2    Severe sepsis ([facility]) A41.9, R65.20    Abnormal EKG R94.31    Empyema ([facility]) J86.9    Pleural effusion, left J90    Hypogammaglobulinemia ([facility]) D80.1    Chronic obstructive pulmonary disease ([facility]) J44.9    Recurrent infections B99.9    Prophylactic immunotherapy Z29.8    Hypoxia R09.02    Hx of chronic lymphocytic leukemia Z85.6    General weakness R53.1    Fever and chills R50.9    Hyponatremia E87.1    Leg cramps R25.2    Long-term current use of intravenous immunoglobulin (IVIG) Z79.899    Parainfluenza type 1 infection B34.8       Isolation/Infection:   Isolation          Contact        Patient Infection Status     Infection Onset Added Last Indicated Last Indicated By Review Planned Expiration Resolved Resolved By    Parainfluenza 08/12/19 08/12/19 08/12/19 Respiratory Panel, Film Array 08/19/19             Nurse Assessment:  Last Vital Signs: BP (!) 142/70   Pulse 76   Temp 98.2 °F (36.8 °C) (Oral)   Resp 18   Ht 5' 3\" (1.6 m)   Wt 115 lb (52.2 kg)   LMP  (LMP Unknown)   SpO2 96%   BMI 20.37 kg/m²     Last documented pain score (0-10 scale): Pain Level: 0  Last Weight:   Wt Readings from Last 1 Encounters:   08/11/19 115 lb (52.2 kg)     Mental Status:  oriented, alert, coherent, logical, thought processes intact and able to concentrate and follow conversation    IV Access:  - None    Nursing Mobility/ADLs:  Walking   Independent  Transfer  Independent  Bathing  Independent  Dressing  Independent  Toileting  Independent  Feeding  Independent  Med Admin  Assisted  Med Delivery   none    Wound Care Documentation and Therapy:  Pressure Ulcer 01/03/17 Buttocks Right stage 1 x 2 sites (Active)   Number of days: 951        Elimination:  Continence:   · Bowel: Yes  · Bladder: Yes  Urinary Catheter: None   Colostomy/Ileostomy/Ileal Conduit: No       Date of Last BM: 8/14/19    Intake/Output Summary (Last 24 hours) at 8/13/2019 1359  Last data filed at 8/13/2019 0930  Gross per 24 hour   Intake 2017 ml   Output 600 ml   Net 1417 ml     I/O last 3 completed shifts: In: 2019 [P.O.:596; I.V.:1423]  Out: 5 [Urine:420]    Safety Concerns: At Risk for Falls    Impairments/Disabilities:      None    Nutrition Therapy:  Current Nutrition Therapy:   - Oral Diet:  Carb Control    Routes of Feeding: Oral  Liquids: Thin Liquids  Daily Fluid Restriction: no  Last Modified Barium Swallow with Video (Video Swallowing Test): not done    Treatments at the Time of Hospital Discharge:   Respiratory Treatments: Albuterol inhaler PRN  Oxygen Therapy:  is not on home oxygen therapy.   Ventilator:    - No ventilator support    Rehab Therapies: Physical Therapy and Occupational Therapy  Weight Bearing Status/Restrictions: No weight bearing restirctions  Other Medical Equipment (for information only, NOT a DME order): Other Treatments:     Patient's personal belongings (please select all that are sent with patient):       RN SIGNATURE:  Electronically signed by Laly Moreno RN on 8/14/19 at 4:29 PM    CASE MANAGEMENT/SOCIAL WORK SECTION    Inpatient Status Date: 08/11/19    Readmission Risk Assessment Score:  Readmission Risk              Risk of Unplanned Readmission:        25         Discharging to Facility/ Agency   · Name:  [facility]  · Address:  · Phone: [phone]  · Fax: [phone]    / signature: Electronically signed by SHERI Diaz on 8/14/2019 at 10:12 AM      PHYSICIAN SECTION    Prognosis: Fair    Condition at Discharge: Stable    Rehab Potential (if transferring to Rehab): Fair    Recommended Labs or Other Treatments After Discharge:     Physician Certification: I certify the above information and transfer of Yolanda Bolaños  is necessary for the continuing treatment of the diagnosis listed and that she requires Home Care for less 30 days.      Update Admission H&P: No change in H&P    PHYSICIAN SIGNATURE:  Electronically signed by Jason Stroud MD on 8/13/19 at 1:59 PM

## 2019-08-13 NOTE — PROGRESS NOTES
Message sent via [email or web address]: \" Pt. c/o chronic cramping pain in her legs not controlled by her usual Tylenol. Can we have something else for pain please? Thanks. \". See orders. Will continue to monitor.

## 2019-08-13 NOTE — PROGRESS NOTES
recommendations  Barriers to Learning: none  REQUIRES PT FOLLOW UP: Yes  Activity Tolerance  Activity Tolerance: Patient Tolerated treatment well;Patient limited by endurance  Activity Tolerance: After ambulating in room, pt requested to sit down due to \"weakness in legs. \" Checked SpO2 levels in mid 80s% Performed pursed lip breathing until levels returned to 90s%       Patient Diagnosis(es): The primary encounter diagnosis was Hypoxia. A diagnosis of Dehydration was also pertinent to this visit. has a past medical history of CLL (chronic lymphocytic leukemia) ([facility] [address] COPD, severe ([facility] [address] Diabetes type 2, uncontrolled ([facility] [address] Disease of blood and blood forming organ, H/O medication noncompliance, Heart murmur, Hyperlipidemia, Hypertension, Influenza A, Other disorders of kidney and ureter in diseases classified elsewhere, and Pneumonia. has a past surgical history that includes  section; Cataract removal; and bronchoscopy (2016). Restrictions  Restrictions/Precautions  Restrictions/Precautions: Fall Risk, Contact Precautions(high fall risk, per MD droplet precautions for parainfluenza)  Position Activity Restriction  Other position/activity restrictions: 78 y.o. female with PMHx of CLL in remission, COPD, (does not wear O2), DM Type 2, HLT, HTN,  presented to [facility] with granddaughter at bedside c/o weakness, decreased appetite, SOB and difficulty breathing for \"awhile. 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 Letitia López, hem/onc.    Vision/Hearing  Vision: Impaired  Vision Exceptions: Wears glasses for reading  Hearing: Within functional limits     Subjective  General  Chart Reviewed: Yes  Patient assessed for rehabilitation services?: Yes  Additional Pertinent Hx: COPD, DM, heart murmur, chronic lymphocytic leukemia  Response To Previous Treatment: Not applicable  Family / Caregiver Present: No  Diagnosis: hypoxia  Follows Commands: Within Functional Limits  General Comment  Comments: Pt agreeable to PT/OT evaluation on this date  Subjective  Subjective: Pt lying supine in bed upon arrival, denying pain  Pain Screening  Patient Currently in Pain: Denies  Pain Assessment  Pain Assessment: 0-10  Pain Level: 0  Vital Signs  Patient Currently in Pain: Denies       Orientation  Orientation  Overall Orientation Status: Within Functional Limits  Social/Functional History  Social/Functional History  Lives With: Daughter(2 Daughters)  Type of Home: House  Home Layout: One level, Laundry in basement  Home Access: Stairs to enter with rails  Entrance Stairs - Number of Steps: 3 ARIEL  Entrance Stairs - Rails: Right  Bathroom Shower/Tub: Tub/Shower unit  Bathroom Toilet: Standard  Bathroom Accessibility: Walker accessible  Home Equipment: Cane(fOR LONGER DISTANCES)  ADL Assistance: Independent  Homemaking Assistance: Needs assistance(50% WITH DAUGHTERS)  Ambulation Assistance: Independent  Transfer Assistance: Independent  Active : No  Patient's  Info: daughters drive  Occupation: Retired  Leisure & Hobbies: puzzles  Additional Comments: no falls within the past 6 months  Cognition        Objective     Observation/Palpation  Posture: Fair  Observation: rounded shoulders    AROM RLE (degrees)  RLE AROM: WFL  AROM LLE (degrees)  LLE AROM : WFL  Strength RLE  Strength RLE: WFL  Comment: grossly 3+/5  Strength LLE  Strength LLE: WFL  Comment: grossly 3+/5  Tone RLE  RLE Tone: Normotonic  Tone LLE  LLE Tone: Normotonic  Motor Control  Gross Motor?: WFL  Sensation  Overall Sensation Status: Impaired  Light Touch: Partial deficits in the RLE;Partial deficits in the LLE(N/T in B calf)  Bed mobility  Supine to Sit: Stand by assistance  Scooting: Stand by assistance  Comment: HOB rosa m, pt initially c/o dizziness upon sitting/first stance EOB.  BP sitting 102/64, I  Short term goal 4: Pt will navigate 3 steps with LRAD and mod I  Long term goals  Time Frame for Long term goals : LTGs = STGs  Patient Goals   Patient goals : To go home       Therapy Time   Individual Concurrent Group Co-treatment   Time In 1033         Time Out 1058         Minutes 25         Timed Code Treatment Minutes: [address] SPT    PT providing direct supervision during session and assisting in making skilled judgements throughout session.   [id number] [facility] PT, DPT [id number]    [id number] [facility], PT

## 2019-08-13 NOTE — PROGRESS NOTES
MCG/ACT inhaler 2 puff Q6H PRN   aspirin EC tablet 81 mg Daily   calcium-vitamin D (OSCAL-500) 500-200 MG-UNIT per tablet 1 tablet BID   pravastatin (PRAVACHOL) tablet 40 mg Nightly       Objective:  BP (!) 142/70   Pulse 76   Temp 98.2 °F (36.8 °C) (Oral)   Resp 18   Ht 5' 3\" (1.6 m)   Wt 115 lb (52.2 kg)   LMP  (LMP Unknown)   SpO2 96%   BMI 20.37 kg/m²     Intake/Output Summary (Last 24 hours) at 8/13/2019 1401  Last data filed at 8/13/2019 0930  Gross per 24 hour   Intake 2017 ml   Output 600 ml   Net 1417 ml      Wt Readings from Last 3 Encounters:   08/11/19 115 lb (52.2 kg)   08/02/19 119 lb (54 kg)   03/21/19 117 lb (53.1 kg)       General appearance:  Appears comfortable, thin, NAD  Eyes: Sclera clear. Pupils equal.  ENT: Moist oral mucosa. Trachea midline, no adenopathy. Cardiovascular: Regular rhythm, normal S1, S2. No murmur. No edema in lower extremities  Respiratory: Not using accessory muscles. Good inspiratory effort. Clear to auscultation bilaterally, no wheeze or crackles. R chest port  GI: Abdomen soft, no tenderness, not distended, normal bowel sounds  Musculoskeletal: No cyanosis in digits, neck supple  Neurology: Grossly intact. No speech or motor deficits  Psych: Normal affect. Alert and oriented in time, place and person  Skin: Warm, dry, normal turgor  Extremity exam shows brisk capillary refill. Peripheral pulses are palpable in lower extremities     Labs and Tests:  CBC:   Recent Labs     08/11/19 1730 08/12/19 0435 08/13/19  0450   WBC 10.7 9.4 11.0   HGB 14.5 12.9 11.2*    135 137     BMP:    Recent Labs     08/11/19 1730 08/12/19 0435 08/13/19  0450   * 129* 132*   K 4.3 4.8 4.0   CL 96* 98* 103   CO2 15* 18* 20*   BUN 65* 55* 39*   CREATININE 2.2* 1.9* 1.4*   GLUCOSE 164* 358* 234*     Hepatic:   Recent Labs     08/11/19  1730   AST 26   ALT 14   BILITOT 0.6   ALKPHOS 114     US RENAL COMPLETE   Final Result   Mild bilateral hydronephrosis.       Bilateral

## 2019-08-13 NOTE — PROGRESS NOTES
Pt. Routine VSS - see flowsheets. Pt. Denies other needs at this time; pt shows no signs of discomfort or distress. Call light/table in reach. Fall and contact precautions in place. Will continue to monitor.

## 2019-08-13 NOTE — CONSULTS
MD Kyler Amin MD Pierce Corning, MD               Office: [phone]                      Fax: [phone]                   NEPHROLOGY INPATIENT PROGRESS NOTE:      IMPRESSION AND RECOMMENDATIONS:    GELACIO (on proteinuric CKD -3): Severe: Oligouric  BL SCR ~1.4-1.6 -----> 2.2   Estimated Creatinine Clearance: 27 mL/min (A) (based on SCr of 1.4 mg/dL (H)).    : Etiology of GELACIO - Likely early ischemic ATN / pre-renal     - other differentials: r/o GN and TI and obstruction w/ her history      : work up so far:   - check UA w/ microscopy first - still awaiting. ..  - Urine electrolytes are not lower , but were checked while on IV fluids  - Renal imaging: Ultrasound - results reviewed as below:  Mild bilateral hydronephrosis.       Bilateral renal hyperechogenicity, which may suggest medical renal disease.      So - check bladder scan, if > 200 cc retention - insert byrd and continue to monitor for now --- will consider repeat imaging in the next few days,:   - IVFs - NS at 75 cc/hr improving AK's to continue for now  -Continue to hold chlorthalidone, okay to keep blood pressure slightly higher, if blood pressure is more than 160s, add nifedipine,  - Rediscussed avoidance of ibuprofen like NSAIDs  - strict I/Os    If renal function remains stable over the next 24 hours then should be okay for discharge in 1-2 days, will need outpatient follow-up for us to manage her CKD    Associated problems:   - Azotemia: moderate - follow w/ renal fx  - Electrolytes: K: wnl  Hyperphosphatemia - lower --- suggesting malnutrition, monitoring for now,  - Volume status: eu-volemic  Na: hyponatremia - chronic - pseudo- w/ IVIG - mild so monitoring w/   GELACIO  BP: HTN - controlled --- keep slightly higher  - Acid-Base: acidosis - non-AG - d/to GELACIO - follow as improving  - Anemia: none     Other problems:  Patient Active Problem List   Diagnosis    CLL (chronic lymphocytic leukemia) low dose dilaudid / fentanyl with close monitoring of CNS and respiratory depression. Please refer to the orders. High Complexity. Multiple complex problems. Discussed with patient, and treatment team- nurse   Thank you for allowing me to participate in this patient's care. Please do not hesitate to contact me with any questions/concerns. We will follow along with you. Trevon Vee MD       [facility] [address] [place]: [phone] or Via [employer]  Fax: [phone]        SUBJECTIVE:  Seen resting comfortably no apparent, no complaints*     Below mentioned Past medical / Surgical, Social, Family medical history reviewed by me. PAST MEDICAL HISTORY:   Past Medical History:   Diagnosis Date    CLL (chronic lymphocytic leukemia) (HCC)     COPD, severe (HCC)     Diabetes type 2, uncontrolled ([facility] [address]     Disease of blood and blood forming organ     H/O medication noncompliance     Does not take her insulin regularly    Heart murmur     Hyperlipidemia     Hypertension     Influenza A 16    Other disorders of kidney and ureter in diseases classified elsewhere     Pneumonia      PAST SURGICAL HISTORY:   Past Surgical History:   Procedure Laterality Date    BRONCHOSCOPY  2016    CATARACT REMOVAL       SECTION         MEDICATIONS: reviewed by me.    Prior to Admission Medications:  Medications Prior to Admission: Nutritional Supplements (ENSURE PLUS HN) LIQD, Take 1 Can by mouth daily chocolate  pravastatin (PRAVACHOL) 40 MG tablet, Take 1 tablet by mouth daily  chlorthalidone (HYGROTON) 25 MG tablet, One half tablet daily  ergocalciferol (ERGOCALCIFEROL) 63390 units capsule, Take 1 capsule by mouth once a week  insulin glargine (TOUJEO MAX SOLOSTAR) 300 UNIT/ML injection pen, Inject 18 Units into the skin nightly  Insulin Pen Needle (PEN NEEDLES \") 30G X 8 MM MISC, 1 each by Does not apply route daily  linagliptin (TRADJENTA) 5 MG tablet, Take 1 tablet Recent Labs     08/11/19  1730 08/12/19  0435 08/13/19  0450   CALCIUM 8.7 7.6* 7.6*   PHOS  --   --  1.4*     No results for input(s): PH, PCO2, PO2 in the last 72 hours.     Invalid input(s): Cristina Marines    ABG:  No results found for: PH, PCO2, PO2, HCO3, BE, THGB, TCO2, O2SAT  VBG:    Lab Results   Component Value Date    PHVEN 7.259 08/11/2019    LIU5GPV 36.8 08/11/2019    BEVEN -9.8 08/11/2019    X1HQFLEE 90 08/11/2019       LDH:    Lab Results   Component Value Date     09/26/2016     01/14/2016     Uric Acid:    Lab Results   Component Value Date    LABURIC 8.2 08/12/2019       PT/INR:    Lab Results   Component Value Date    PROTIME 10.5 08/02/2019    INR 0.92 08/02/2019     Warfarin PT/INR:  No components found for: PTPATWAR, PTINRWAR  PTT:    Lab Results   Component Value Date    APTT 27.7 01/04/2017   [APTT}  Last 3 Troponin:    Lab Results   Component Value Date    TROPONINI <0.01 08/11/2019    TROPONINI <0.01 03/19/2018    TROPONINI <0.01 12/22/2016       U/A:    Lab Results   Component Value Date    COLORU YELLOW 03/21/2019    PROTEINU 30 03/21/2019    PHUR 5.5 03/21/2019    WBCUA 30 03/21/2019    RBCUA 17 03/21/2019    MUCUS 1+ 12/23/2016    TRICHOMONAS Present 05/24/2015    YEAST Present 02/15/2016    BACTERIA 4+ 03/17/2019    CLARITYU CLOUDY 03/21/2019    SPECGRAV 1.013 03/21/2019    LEUKOCYTESUR SMALL 03/21/2019    UROBILINOGEN 1.0 03/21/2019    BILIRUBINUR Negative 03/21/2019    BILIRUBINUR NEGATIVE 04/25/2010    BLOODU MODERATE 03/21/2019    GLUCOSEU 100 03/21/2019    GLUCOSEU >=1000 04/25/2010    AMORPHOUS 4+ 04/25/2010     Microalbumen/Creatinine ratio:  No components found for: RUCREAT  24 Hour Urine for Protein:  No components found for: RAWUPRO, UHRS3, VXBC33JR, UTV3  24 Hour Urine for Creatinine Clearance:  No components found for: CREAT4, UHRS10, UTV10  Urine Toxicology:  No components found for: IAMMENTA, IBARBIT, IBENZO, ICOCAINE, IMARTHC, IOPIATES,

## 2019-08-14 VITALS
SYSTOLIC BLOOD PRESSURE: 178 MMHG | BODY MASS INDEX: 20.38 KG/M2 | OXYGEN SATURATION: 92 % | HEART RATE: 96 BPM | WEIGHT: 115 LBS | RESPIRATION RATE: 16 BRPM | HEIGHT: 63 IN | TEMPERATURE: 97.8 F | DIASTOLIC BLOOD PRESSURE: 80 MMHG

## 2019-08-14 PROBLEM — R65.10 SIRS (SYSTEMIC INFLAMMATORY RESPONSE SYNDROME) (HCC): Status: ACTIVE | Noted: 2019-08-14

## 2019-08-14 LAB
ANION GAP SERPL CALCULATED.3IONS-SCNC: 10 MMOL/L (ref 3–16)
BASOPHILS ABSOLUTE: 0 K/UL (ref 0–0.2)
BASOPHILS RELATIVE PERCENT: 0.1 %
BUN BLDV-MCNC: 22 MG/DL (ref 7–20)
CALCIUM SERPL-MCNC: 8 MG/DL (ref 8.3–10.6)
CHLORIDE BLD-SCNC: 107 MMOL/L (ref 99–110)
CO2: 23 MMOL/L (ref 21–32)
CREAT SERPL-MCNC: 1.2 MG/DL (ref 0.6–1.2)
EOSINOPHILS ABSOLUTE: 0.1 K/UL (ref 0–0.6)
EOSINOPHILS RELATIVE PERCENT: 1.1 %
GFR AFRICAN AMERICAN: 52
GFR NON-AFRICAN AMERICAN: 43
GLUCOSE BLD-MCNC: 113 MG/DL (ref 70–99)
GLUCOSE BLD-MCNC: 126 MG/DL (ref 70–99)
GLUCOSE BLD-MCNC: 47 MG/DL (ref 70–99)
GLUCOSE BLD-MCNC: 76 MG/DL (ref 70–99)
HCT VFR BLD CALC: 30.8 % (ref 36–48)
HEMOGLOBIN: 10.6 G/DL (ref 12–16)
LYMPHOCYTES ABSOLUTE: 1.7 K/UL (ref 1–5.1)
LYMPHOCYTES RELATIVE PERCENT: 20.1 %
MCH RBC QN AUTO: 29.7 PG (ref 26–34)
MCHC RBC AUTO-ENTMCNC: 34.4 G/DL (ref 31–36)
MCV RBC AUTO: 86.5 FL (ref 80–100)
MONOCYTES ABSOLUTE: 0.5 K/UL (ref 0–1.3)
MONOCYTES RELATIVE PERCENT: 5.9 %
NEUTROPHILS ABSOLUTE: 6 K/UL (ref 1.7–7.7)
NEUTROPHILS RELATIVE PERCENT: 72.8 %
PDW BLD-RTO: 13.8 % (ref 12.4–15.4)
PERFORMED ON: ABNORMAL
PERFORMED ON: ABNORMAL
PERFORMED ON: NORMAL
PLATELET # BLD: 152 K/UL (ref 135–450)
PMV BLD AUTO: 8.6 FL (ref 5–10.5)
POTASSIUM SERPL-SCNC: 3.5 MMOL/L (ref 3.5–5.1)
RBC # BLD: 3.56 M/UL (ref 4–5.2)
SODIUM BLD-SCNC: 140 MMOL/L (ref 136–145)
WBC # BLD: 8.2 K/UL (ref 4–11)

## 2019-08-14 PROCEDURE — 85025 COMPLETE CBC W/AUTO DIFF WBC: CPT

## 2019-08-14 PROCEDURE — 2580000003 HC RX 258: Performed by: NURSE PRACTITIONER

## 2019-08-14 PROCEDURE — 97535 SELF CARE MNGMENT TRAINING: CPT

## 2019-08-14 PROCEDURE — 94760 N-INVAS EAR/PLS OXIMETRY 1: CPT

## 2019-08-14 PROCEDURE — 80048 BASIC METABOLIC PNL TOTAL CA: CPT

## 2019-08-14 PROCEDURE — 99232 SBSQ HOSP IP/OBS MODERATE 35: CPT | Performed by: INTERNAL MEDICINE

## 2019-08-14 PROCEDURE — 6360000002 HC RX W HCPCS: Performed by: NURSE PRACTITIONER

## 2019-08-14 PROCEDURE — 96372 THER/PROPH/DIAG INJ SC/IM: CPT

## 2019-08-14 PROCEDURE — 6370000000 HC RX 637 (ALT 250 FOR IP): Performed by: NURSE PRACTITIONER

## 2019-08-14 RX ADMIN — Medication 10 ML: at 10:17

## 2019-08-14 RX ADMIN — HEPARIN SODIUM 5000 UNITS: 5000 INJECTION INTRAVENOUS; SUBCUTANEOUS at 10:17

## 2019-08-14 RX ADMIN — ASPIRIN 81 MG: 81 TABLET, COATED ORAL at 10:17

## 2019-08-14 RX ADMIN — CALCIUM CARBONATE-VITAMIN D TAB 500 MG-200 UNIT 1 TABLET: 500-200 TAB at 10:17

## 2019-08-14 ASSESSMENT — ENCOUNTER SYMPTOMS
EYE DISCHARGE: 0
DIARRHEA: 0
COLOR CHANGE: 0
CHEST TIGHTNESS: 0
RHINORRHEA: 0
NAUSEA: 0
TROUBLE SWALLOWING: 0
APNEA: 0
SHORTNESS OF BREATH: 0
FACIAL SWELLING: 0
CHOKING: 0
STRIDOR: 0
ABDOMINAL PAIN: 0
BLOOD IN STOOL: 0
COUGH: 0
PHOTOPHOBIA: 0
EYE REDNESS: 0

## 2019-08-14 ASSESSMENT — PAIN SCALES - GENERAL
PAINLEVEL_OUTOF10: 0

## 2019-08-14 NOTE — PROGRESS NOTES
Glucose level 47 (BMP), patient alert and tolerate PO, and 8 ounces of orange juice was given. AccuChek 76 @7:00.

## 2019-08-14 NOTE — PLAN OF CARE
impaired skin integrity will decrease  Description  Risk for impaired skin integrity will decrease  Outcome: Ongoing     Problem: Tissue Perfusion - Renal, Altered:  Goal: Electrolytes within specified parameters  Description  Electrolytes within specified parameters  Outcome: Ongoing  Goal: Urine creatinine clearance will be within specified parameters  Description  Urine creatinine clearance will be within specified parameters  Outcome: Ongoing  Goal: Serum creatinine will be within specified parameters  Description  Serum creatinine will be within specified parameters  Outcome: Ongoing  Goal: Ability to achieve a balanced intake and output will improve  Description  Ability to achieve a balanced intake and output will improve  Outcome: Ongoing     Problem: Musculor/Skeletal Functional Status  Goal: Highest potential functional level  Outcome: Ongoing  Goal: Absence of falls  Outcome: Ongoing

## 2019-08-14 NOTE — PROGRESS NOTES
VSS, assessment completed, and meds given. Pt appears calm with no signs of distress. Breathing is regular and unlabored. Call light is within reach and bed alarm is engaged. No further needs at this time.

## 2019-08-14 NOTE — DISCHARGE SUMMARY
Hospital Medicine Discharge Summary    Patient: Josette Maria     Gender: female  : 1940   Age: 78 y.o. MRN: [id number]    Admitting Physician: Joy Cook MD  Discharge Physician: Matt Fuller MD     Code Status: Full Code     Admit Date: 2019   Discharge Date:   19    Disposition:  Home with  [facility]    Discharge Diagnoses: Active Hospital Problems    Diagnosis Date Noted    SIRS (systemic inflammatory response syndrome) (HCC) [R65.10] 2019    Parainfluenza infection [B34.8] 2019    General weakness [R53.1]     Fever and chills [R50.9]     Hyponatremia [E87.1]     Leg cramps [R25.2]     Long-term current use of intravenous immunoglobulin (IVIG) [Z79.899]     Hypoxia [R09.02] 2019    Hx of chronic lymphocytic leukemia [Z85.6] 2019    Chronic obstructive pulmonary disease (HCC) [J44.9]     Acute respiratory failure with hypoxia ([facility] [address] [J96.01] 2016    Type 2 diabetes mellitus without complication ([facility] [address] [D01.5] 2016    GELACIO (acute kidney injury) ([facility] [address] [N17.9] 2016    Dehydration [E86.0] 10/26/2015    Diabetes type 2, uncontrolled ([address] [E11.65]     CLL (chronic lymphocytic leukemia) ([facility] [address] [C91.90] 2014       Follow-up appointments:  one week    Outpatient to do list: F/U with PCP. F/U with Onc.  F/U with ID PRN    Condition at Discharge:  Stable    Hospital Course:   79 yo F with history of CLL, CKD 3, HTN, severe COPD, UC DM 2 on insulin, on monthly IVIG came to ER with SOB and weakness. Admitted as inpatient for acute respiratory failure with hypoxia with possible port infection. Was on 3 L O2 via NC for hypoxia on RA with O2 sats in 80s (does not use home O2). Followed by Onc and ID. Started on IV Abx. Blood cx negative. Found to have Parainfluenza 1 on respiratory film array. Weaned off O2 to RA. IV Abx stopped and no fevers. Will arrange for home PT/OT. Will f/u with PCP and Onc.   Can see ID TRIG 82 12/22/2016     Lab Results   Component Value Date    INR 0.92 08/02/2019    INR 1.18 (H) 01/04/2017    INR 1.01 12/27/2016       Radiology:  Xr Chest Standard (2 Vw)    Result Date: 8/11/2019  EXAMINATION: TWO XRAY VIEWS OF THE CHEST 8/11/2019 5:19 pm COMPARISON: 03/17/2019. HISTORY: ORDERING SYSTEM PROVIDED HISTORY: weakness, dyspnea, hypoxia TECHNOLOGIST PROVIDED HISTORY: Reason for exam:->weakness, dyspnea, hypoxia Reason for Exam: INCREASE SHORTNESS OF BREATH. FORMER SMOKER. HISTORY OF CHRONIC LYMPHOCYTIC LEUKEMIA, COPD, DIABETES, HEART MURMUR, HTN Acuity: Acute Type of Exam: Initial FINDINGS: Frontal and lateral views of the chest.  Low left lung volume with mild elevation of left hemidiaphragm. Bilateral basilar heterogeneous opacities. Stable left subclavian port. Possible small left pleural effusion. No pneumothorax. Stable cardiomediastinal silhouette and great vessels with redemonstration of atherosclerotic thoracic aorta. Multilevel degenerative disc disease. Osteopenia. Low left lung volume with mild elevation of left hemidiaphragm. Bilateral basilar heterogeneous opacities. Differential considerations include subsegmental atelectasis and a developing infectious/inflammatory process. Possible small left pleural effusion. Osteopenia. Nm Lung Vent/perfusion (vq)    Result Date: 8/12/2019  EXAMINATION: NUCLEAR MEDICINE VENTILATION PERFUSION SCAN. 8/12/2019 TECHNIQUE: 13 millicuries  was administered prior to planar imaging of the lungs in the posterior projection. Then, 6 millicuries of Tc 87I MAA was administered intravenously prior to planar imaging of the lungs in multiple projections. COMPARISON: Chest radiograph 08/11/2019.  HISTORY: ORDERING SYSTEM PROVIDED HISTORY: DYSPNEA, ON EXERTION TECHNOLOGIST PROVIDED HISTORY: Reason for Exam: dtspnea on exertion,sob Acuity: Unknown Type of Exam: Initial FINDINGS: PERFUSION: There is a small subsegmental perfusion mismatches in the right upper lobe. Otherwise no peripheral wedge-shaped defects are identified. VENTILATION: Ventilation images are unremarkable. CHEST RADIOGRAPH: No consolidation is evident on chest x-ray. Low probability for pulmonary embolus. Small ventilation perfusion mismatch is noted in the right upper lobe. Us Renal Complete    Result Date: 8/12/2019  EXAMINATION: RETROPERITONEAL ULTRASOUND OF THE KIDNEYS AND URINARY BLADDER 8/12/2019 COMPARISON: None HISTORY: ORDERING SYSTEM PROVIDED HISTORY: RENAL FAILURE, ACUTE (KIDNEY INJURY) TECHNOLOGIST PROVIDED HISTORY: Reason for Exam: renal failure Acuity: Unknown Type of Exam: Unknown FINDINGS: Kidneys: The right kidney measures 8.9 cm in length and the left kidney measures 8.0 cm in length. There is mild bilateral hydronephrosis. Bilateral renal hyperechogenicity is noted as well. Bladder: Bilateral ureteral jets are present. Urinary bladder unremarkable. Mild bilateral hydronephrosis. Bilateral renal hyperechogenicity, which may suggest medical renal disease. Vl Extremity Venous Bilateral    Result Date: 8/12/2019  Vascular Lower Extremities DVT Study Procedure -- PRELIMINARY SONOGRAPHER REPORT --   Demographics   Patient Name       Edythe Cross   Date of Study      08/12/2019        Gender              Female   Patient Number     [id number]        Date of Birth       1940   Visit Number       [id number]         Age                 78 year(s)   Accession Number   [id number]         Room Number         8895   Corporate ID       [id number]          Jon Haider RVT,                                                           RDMS, AB, OB/GYN   Ordering Physician Melody Lamas MD  Interpreting        Patricia Kellogg MD                                       Physician  Procedure Type of Study:   Veins:Lower Extremities DVT Study, VASC EXTREMITY VENOUS DUPLEX BILATERAL.   Tech Comments Right No evidence of deep vein or superficial vein thrombosis involving the right lower extremity. Left No evidence of deep vein or superficial vein thrombosis involving the left lower extremity. Ir Repair Cvad W Sq Port/pump    Result Date: 8/2/2019  Portacatheter evaluation Portacatheter injection Retrieval of intravascular catheter with snare and catheter stripping Conscious sedation Fluoroscopy and ultrasound guidance INDICATIONS: Left subclavian portacatheter malfunction, no blood return Procedure performed by Dr. Alexia Gilbert Risks and benefits discussed and informed consent obtained. CONSCIOUS SEDATION: Intravenous Versed and fentanyl with hemodynamic monitoring and pulse oximetry. Independent nursing observer: Incremental administration  of Versed and fentanyl Time started: 1418 Time completed: 1445 Post sedation exam: No complications, stable vital signs STERILE PREP: Full maximum sterile field/barrier technique was followed (with cap and mask, gloves and sterile gown, as well as broad field sterile drapes). Local disinfection was performed with broad field application of orange dyed chloraprep solution, which was allowed to dry fully prior to the initiation of the procedure. Maintain sterile field at all times. Ultrasound probe cleansing with sterile gel and probe covers were used. Medications labeled Initial portacatheter injection: Persistence with flushing. No blood return. PROCEDURE: Pulmonary evaluation of the right common femoral vein demonstrates vessel patency with coaptation of the vessel wall and respiratory variation. Targeted ultrasound of the right common femoral vein was uneventful. 6 Jordanian sheath placement. Initially loops there was placed. However, catheter is epithelial as to the wall of the vena cava. 6 Jordanian catheter, angled pigtail was placed superiorly above the catheter. Tip-deflecting wire was utilized.  The catheter was stripped from the lateral wall of the superior vena cava Subsequent catheter stripping with loop snare was performed multiple times Postprocedure patency with blood return and flushing of the portacatheter was confirmed. Catheter injection was also performed confirming patency No extravasation Fluoroscopy time 8.4 minute Ultrasound images: 1, sent to electronic record Complications: None Fluoroscopic images: 27     1. Adherence of the portacatheter to the lateral wall of the superior vena cava with additional fibrin sheath. 2. Uneventful retrieval of intravascular catheter and repositioning in the central lumen of the superior vena cava successful stripping. 3. Catheter is now functional and Patent and ready for use        The patient was seen and examined on day of discharge and this discharge summary is in conjunction with any daily progress note from day of discharge. Time Spent on discharge is 45 minutes  in the examination, evaluation, counseling and review of medications and discharge plan. Note that more than 30 minutes was spent in preparing discharge papers, discussing discharge with patient, medication review, etc.       Signed:    Tu Perez MD   8/14/2019      Thank you JENNIFER Cox CNP for the opportunity to be involved in this patient's care.  If you have any questions or concerns please feel free to contact me at [address] [place] [address]

## 2019-08-14 NOTE — CONSULTS
MD Shantell Pearce MD Inda Albright, MD               Office: [phone]                      Fax: [phone]                   NEPHROLOGY INPATIENT PROGRESS NOTE:      IMPRESSION AND RECOMMENDATIONS:    GELACIO (on proteinuric CKD -3): Severe: Oligouric  BL SCR ~1.4-1.6 -----> peaked at 2.2 --> improving---  Estimated Creatinine Clearance: 31 mL/min (based on SCr of 1.2 mg/dL).    : Etiology of GELACIO - Likely early ischemic ATN / pre-renal     - other differentials: r/o GN and TI and obstruction w/ her history      : work up so far:   - check UA w/ microscopy first - ?UTI signs  - Urine electrolytes are not lower , but were checked while on IV fluids  - Renal imaging: Ultrasound - results reviewed as below:  Mild bilateral hydronephrosis.       Bilateral renal hyperechogenicity, which may suggest medical renal disease.      So - monitor bladder scan, if > 200 cc retention - insert byrd and continue to monitor for now --- will consider repeat imaging in the next few days as outpt   - s/p  IVFs - NS at 75 cc/hr improving GELACIO  to continue for now    -Continue to hold chlorthalidone, okay to keep blood pressure slightly higher, if blood pressure is more than 160s, add nifedipine,  - Rediscussed avoidance of ibuprofen like NSAIDs  - strict I/Os    Since her renal function remains stable , should be okay for discharge now, will need outpatient follow-up for us to manage her CKD    Associated problems:   - Azotemia: moderate - follow w/ renal fx  - Electrolytes: K: wnl  Hyperphosphatemia - lower --- suggesting malnutrition, monitoring for now,  - Volume status: eu-volemic  Na: hyponatremia - chronic - pseudo- w/ IVIG - mild so monitoring w/   GELACIO  Improved   BP: HTN - controlled --- keep slightly higher  - Acid-Base: acidosis - non-AG - d/to GELACIO - follow as improving  - Anemia: none     Other problems:  (+) Parainfluenza 1 - mx per ID and medicine    Patient Active 97.8 °F (36.6 °C) Oral 88 16 94 %   08/14/19 0554 110/62 98 °F (36.7 °C) Oral 80 16 90 %   08/14/19 0047 136/68 98.2 °F (36.8 °C) Oral 83 16 92 %   08/13/19 2215 (!) 156/77 98.7 °F (37.1 °C) Oral 84 16 92 %   08/13/19 2000 (!) 156/75 98.1 °F (36.7 °C) Oral 93 18 94 %   08/13/19 1525 137/72 98.5 °F (36.9 °C) Oral 85 18 96 %   08/13/19 1425 125/72 98.2 °F (36.8 °C) Oral 90 18 97 %       Intake/Output Summary (Last 24 hours) at 8/14/2019 1255  Last data filed at 8/14/2019 1013  Gross per 24 hour   Intake 2333 ml   Output 1075 ml   Net 1258 ml       Physical Exam   Constitutional: She is oriented to person, place, and time. She appears well-nourished. She appears ill. No distress. HENT:   Head: Normocephalic and atraumatic. Right Ear: External ear normal.   Left Ear: External ear normal.   Nose: Nose normal.   Mouth/Throat: Mucous membranes are not dry. Eyes: Conjunctivae are normal. No scleral icterus. Neck: Normal range of motion. Neck supple. No JVD present. No thyroid mass present. Cardiovascular: Normal rate and regular rhythm. Pulmonary/Chest: Effort normal and breath sounds normal. No respiratory distress. Abdominal: Soft. Bowel sounds are normal.   Musculoskeletal: She exhibits no edema or deformity. Neurological: She is alert and oriented to person, place, and time. Skin: Skin is warm and dry. Psychiatric: She has a normal mood and affect. Her behavior is normal.   Vitals reviewed.          DATA:  Diagnostic tests reviewed by me for today's visit:    Recent Labs     08/11/19  1730 08/12/19  0435 08/13/19  0450 08/14/19  0515   WBC 10.7 9.4 11.0 8.2   HCT 43.0 38.5 33.0* 30.8*    135 137 152     Iron Saturation:  No components found for: PERCENTFE  FERRITIN:    Lab Results   Component Value Date    FERRITIN 1,573.0 12/30/2016     IRON:    Lab Results   Component Value Date    IRON 36 12/30/2016     TIBC:    Lab Results   Component Value Date    TIBC 202 12/30/2016       Recent Labs 08/11/19  1730 08/12/19  0435 08/13/19  0450 08/14/19  0515   * 129* 132* 140   K 4.3 4.8 4.0 3.5   CL 96* 98* 103 107   CO2 15* 18* 20* 23   BUN 65* 55* 39* 22*   CREATININE 2.2* 1.9* 1.4* 1.2     Recent Labs     08/11/19  1730 08/12/19  0435 08/13/19  0450 08/14/19  0515   CALCIUM 8.7 7.6* 7.6* 8.0*   PHOS  --   --  1.4*  --      No results for input(s): PH, PCO2, PO2 in the last 72 hours.     Invalid input(s): [id number], INSPIREDO2    ABG:  No results found for: PH, PCO2, PO2, HCO3, BE, THGB, TCO2, O2SAT  VBG:    Lab Results   Component Value Date    PHVEN 7.259 08/11/2019    NCS1TMF 36.8 08/11/2019    BEVEN -9.8 08/11/2019    S2KQLNHC 90 08/11/2019       LDH:    Lab Results   Component Value Date     09/26/2016     01/14/2016     Uric Acid:    Lab Results   Component Value Date    LABURIC 8.2 08/12/2019       PT/INR:    Lab Results   Component Value Date    PROTIME 10.5 08/02/2019    INR 0.92 08/02/2019     Warfarin PT/INR:  No components found for: PTPATWAR, PTINRWAR  PTT:    Lab Results   Component Value Date    APTT 27.7 01/04/2017   [APTT}  Last 3 Troponin:    Lab Results   Component Value Date    TROPONINI <0.01 08/11/2019    TROPONINI <0.01 03/19/2018    TROPONINI <0.01 12/22/2016       U/A:    Lab Results   Component Value Date    COLORU YELLOW 08/13/2019    PROTEINU TRACE 08/13/2019    PHUR 6.0 08/13/2019    WBCUA 198 08/13/2019    RBCUA 10 08/13/2019    MUCUS 1+ 12/23/2016    TRICHOMONAS Present 05/24/2015    YEAST Present 02/15/2016    BACTERIA 4+ 03/17/2019    CLARITYU CLOUDY 08/13/2019    SPECGRAV 1.012 08/13/2019    LEUKOCYTESUR LARGE 08/13/2019    UROBILINOGEN 0.2 08/13/2019    BILIRUBINUR Negative 08/13/2019    BILIRUBINUR NEGATIVE 04/25/2010    BLOODU SMALL 08/13/2019    GLUCOSEU Negative 08/13/2019    GLUCOSEU >=1000 04/25/2010    AMORPHOUS 4+ 04/25/2010     Microalbumen/Creatinine ratio:  No components found for: RUCREAT  24 Hour Urine for Protein:  No components found hydronephrosis.  Bilateral renal hyperechogenicity is   noted as well.           Bladder:       Bilateral ureteral jets are present.  Urinary bladder unremarkable.           Impression   Mild bilateral hydronephrosis.       Bilateral renal hyperechogenicity, which may suggest medical renal disease.

## 2019-08-14 NOTE — PROGRESS NOTES
Occupational Therapy  Facility/Department: [address] ONCOLOGY  Daily Treatment Note/Discharge Summary  NAME: Shawanda Juárez  : 1940  MRN: [id number]    Date of Service: 2019    Discharge Recommendations:Anita Ellis scored a 23/24 on the AM-PAC ADL Inpatient form. Current research shows that an AM-PAC score of 18 or greater is typically associated with a discharge to the patient's home setting. Based on the patients AM-PAC score and their current ADL deficits, it is recommended that the patient have 2-3 sessions per week of Occupational Therapy at d/c to increase the patients independence. HOME HEALTH CARE: LEVEL 1 STANDARD    - Initial home health evaluation to occur within 24-48 hours, in patient home   - Therapy to evaluate with goal of regaining prior level of functioning   - Therapy to evaluate if patient has [address] needs for personal care     OT Equipment Recommendations  Equipment Needed: Yes  Mobility Devices: ADL Assistive Devices  ADL Assistive Devices: Shower Chair without back  Other: secondary to decreased endurance, pt would benefit from shower chair to fit in tub     Assessment   Performance deficits / Impairments: Decreased endurance;Decreased ADL status; Decreased high-level IADLs;Decreased balance  Assessment: pt not at baseline level of functioning and would benefit from skilled OT services in order to return to PLOF  Treatment Diagnosis: pt presents with decreased endurance, ADL/IADL status and balance secondary to hypoxia/parainfluenza  Prognosis: Good  Decision Making: Low Complexity  History: pt lives at home with daughters, independent with ADL/IADL tasks at baseline   REQUIRES OT FOLLOW UP: Yes  Activity Tolerance  Activity Tolerance: Patient Tolerated treatment well  Safety Devices  Safety Devices in place: Yes  Type of devices: All fall risk precautions in place;Nurse notified; Patient at risk for falls;Call light within reach; Left in chair;Chair alarm in place;Gait Minutes 30           Timed Code Treatment Minutes:  30 minutes  Total Treatment Minutes:  30 minutes     Deb CABELLO/JACK Mandujano, OTR/L [id number] (I have reviewed and agree with the above documentation.  I supervised this session)

## 2019-08-14 NOTE — CARE COORDINATION
Pt will d/c to home today w/[facility] services on board. All info has been faxed to [facility].   Electronically signed by SHERI Guillen on 8/14/2019 at 4:29 PM

## 2019-08-14 NOTE — PROGRESS NOTES
Pt discharged to home with home care, discharge instructions provided and explained to pt.   Pt transported to front entrance in wheelchair, belongings sent with pt

## 2019-08-14 NOTE — PROGRESS NOTES
Infectious Diseases   Progress Note      Admission Date: 8/11/2019  Hospital Day: Hospital Day: 4  Attending: Robinson Walls MD  Date of service: 8/14/2019    Chief complaint/ Reason for consult: The patient was seen today for the following:    · Generalized weakness  · Subjective fever and chills due to parainfluenza 1 infection  · Leg cramping  · Poor appetite for 2 days  · Hyponatremia  · Acute kidney injury    Microbiology:        I have reviewed all available micro lab data and cultures    · Blood culture (2/2) - collected on 8/11/2019: In process    Antibiotics and immunizations:       Current antibiotics: All antibiotics and their doses were reviewed by me    Recent Abx Admin      No antibiotic orders with administrations found. Immunization History: All immunization history was reviewed by me today. Immunization History   Administered Date(s) Administered    Influenza Virus Vaccine 02/16/2016, 10/12/2017    Influenza, High Dose (Fluzone 65 yrs and older) 11/29/2018    Influenza, Camilla Spar, 3 yrs and older, IM, PF (Fluzone 3 yrs and older or Afluria 5 yrs and older) 09/27/2016    Pneumococcal Conjugate 13-valent ([id number]) 06/07/2018    Pneumococcal Polysaccharide ([id number]) 04/26/2010, 02/16/2016    Zoster Live (Zostavax) 08/11/2017       Known drug allergies: All allergies were reviewed and updated    No Known Allergies      Assessment:     The patient is a 78 y.o. old female who  has a past medical history of CLL (chronic lymphocytic leukemia) ([facility] [address] COPD, severe ([address] Diabetes type 2, uncontrolled ([facility] [address] Disease of blood and blood forming organ, H/O medication noncompliance, Heart murmur, Hyperlipidemia, Hypertension, Influenza A (2/13/16), Other disorders of kidney and ureter in diseases classified elsewhere, and Pneumonia.  with following problems:    · Generalized weakness-this is ongoing  · Subjective fever and chills due to parainfluenza 1 infection -resolved  · Leg cramping-improving  · Poor appetite for 2 days-ongoing  · Hyponatremia-this is being corrected  · Acute kidney injury -serum creatinine 1.2 today  · Port-A-Cath malfunction status post fluoroscopy and catheter retrieval on 8/2/2019 at [facility]  · Poorly controlled type 2 diabetes mellitus-diabetes counseling done  · CLL  · COPD  · On monthly IVIG for acquired hypogammaglobulinemia of unclear etiology  · Essential hypertension  · Mixed hyperlipidemia      Discussion:      I had stopped her IV antibiotics yesterday. She is afebrile. White cell count is 8200. She did have large leukocyte esterase on the urine analysis yesterday. Unfortunately, no urine culture sent    Serum creatinine is 1.2. Patient has been getting unexplained hypoglycemia    Plan:     Diagnostic Workup:    · Will order urine culture  · Continue to follow  fever curve, WBC count and blood cultures  · Follow up on liver and renal function    Antimicrobials:    · Will keep the patient off antibiotic at this time  · If glucose control is better, patient can be discharged home  · I can follow-up the urine culture as an outpatient. Advised patient to call my office if he develops any new fever, flank pain etc.  · Maintain good glycemic control  · DVT prophylaxis  · Port-A-Cath site care  · Discussed all above with patient and RN      I/v access Management:    · Continue to monitor i.v access sites for erythema, induration, discharge or tenderness. · As always, continue efforts to minimize tubes/lines/drains as clinically appropriate to reduce chances of line associated infections. Patient education and counseling:       · The patient was educated in detail about the side-effects of various antibiotics and things to watch for like new rashes, lip swelling, severe reaction, worsening diarrhea, break through fever etc.  · Discussed patient's condition and what to expect.  All of the patient's questions were addressed in a satisfactory Family History: All family history was reviewed today. Problem Relation Age of Onset    Emphysema Father     Asthma Father     Heart Disease Sister     High Blood Pressure Sister     Kidney Disease Sister     Diabetes Sister     Hypertension Sister     Diabetes Mother     Hypertension Mother        Objective:       PHYSICAL EXAM:      Vitals:   Vitals:    08/13/19 2215 08/14/19 0047 08/14/19 0554 08/14/19 1010   BP: (!) 156/77 136/68 110/62 115/72   Pulse: 84 83 80 88   Resp: 16 16 16 16   Temp: 98.7 °F (37.1 °C) 98.2 °F (36.8 °C) 98 °F (36.7 °C) 97.8 °F (36.6 °C)   TempSrc: Oral Oral Oral Oral   SpO2: 92% 92% 90% 94%   Weight:       Height:           Physical Exam   Constitutional: She is oriented to person, place, and time. She appears well-developed. No distress. Appears tired   HENT:   Head: Normocephalic. Right Ear: External ear normal.   Left Ear: External ear normal.   Nose: Nose normal.   Mouth/Throat: Oropharynx is clear and moist.   Eyes: Pupils are equal, round, and reactive to light. Conjunctivae are normal. Right eye exhibits no discharge. Left eye exhibits no discharge. No scleral icterus. Neck: Normal range of motion. Neck supple. Cardiovascular: Normal rate and regular rhythm. Exam reveals no friction rub. No murmur heard. Pulmonary/Chest: Effort normal. No stridor. She has no wheezes. She has no rales. She exhibits no tenderness. Abdominal: Soft. She exhibits no mass. There is no tenderness. There is no rebound and no guarding. Musculoskeletal: She exhibits no edema or tenderness. Lymphadenopathy:     She has no cervical adenopathy. Neurological: She is alert and oriented to person, place, and time. She exhibits normal muscle tone. Skin: Skin is warm and dry. No rash noted. She is not diaphoretic. No erythema. Psychiatric: She has a normal mood and affect. Judgment normal.   Nursing note and vitals reviewed.        Lines: All vascular access sites are healthy with no local erythema, discharge or tenderness. Intake and output:    I/O last 3 completed shifts: In: 2691 [P.O.:948; I.V.:1743]  Out: 1325 [Urine:775; Stool:550]    Lab Data:   All available labs and old records have been reviewed by me. CBC:  Recent Labs     08/12/19  0435 08/13/19  0450 08/14/19  0515   WBC 9.4 11.0 8.2   RBC 4.42 3.81* 3.56*   HGB 12.9 11.2* 10.6*   HCT 38.5 33.0* 30.8*    137 152   MCV 87.2 86.6 86.5   MCH 29.2 29.4 29.7   MCHC 33.5 34.0 34.4   RDW 14.0 13.7 13.8        BMP:  Recent Labs     08/12/19  0435 08/13/19  0450 08/14/19  0515   * 132* 140   K 4.8 4.0 3.5   CL 98* 103 107   CO2 18* 20* 23   BUN 55* 39* 22*   CREATININE 1.9* 1.4* 1.2   CALCIUM 7.6* 7.6* 8.0*   GLUCOSE 358* 234* 47*        Hepatic Function Panel:   Lab Results   Component Value Date    ALKPHOS 114 08/11/2019    ALT 14 08/11/2019    AST 26 08/11/2019    PROT 8.0 08/11/2019    PROT 6.2 08/03/2017    BILITOT 0.6 08/11/2019    BILIDIR 0.8 12/25/2016    IBILI 0.4 12/25/2016    LABALBU 4.3 08/11/2019       CPK: No results found for: CKTOTAL  ESR:   Lab Results   Component Value Date    SEDRATE 6 01/14/2016     CRP: No results found for: CRP        Imaging: All pertinent images and reports for the current visit were reviewed by me during this visit. US RENAL COMPLETE   Final Result   Mild bilateral hydronephrosis. Bilateral renal hyperechogenicity, which may suggest medical renal disease. VL Extremity Venous Bilateral         NM LUNG VENT/PERFUSION (VQ)   Final Result   Low probability for pulmonary embolus. Small ventilation perfusion mismatch   is noted in the right upper lobe. XR CHEST STANDARD (2 VW)   Final Result   Low left lung volume with mild elevation of left hemidiaphragm. Bilateral   basilar heterogeneous opacities. Differential considerations include   subsegmental atelectasis and a developing infectious/inflammatory process.       Possible small left (healthcare-associated pneumonia) J18.9    Acute respiratory failure with hypoxia (HCC) J96.01    Chronic lymphocytic leukemia of B-cell type in remission (HCC) C91.11    Septic shock (HCC) A41.9, R65.21    Acute respiratory failure with hypoxia (HCC) J96.01    Bacteremia R78.81    Infection due to Haemophilus influenzae A49.2    Severe sepsis (HCC) A41.9, R65.20    Abnormal EKG R94.31    Empyema (HCC) J86.9    Pleural effusion, left J90    Hypogammaglobulinemia (HCC) D80.1    Chronic obstructive pulmonary disease (HCC) J44.9    Recurrent infections B99.9    Prophylactic immunotherapy Z29.8    Hypoxia R09.02    Hx of chronic lymphocytic leukemia Z85.6    General weakness R53.1    Fever and chills R50.9    Hyponatremia E87.1    Leg cramps R25.2    Long-term current use of intravenous immunoglobulin (IVIG) Z79.899    Parainfluenza infection B34.8    SIRS (systemic inflammatory response syndrome) ([address] R65.10       Please note that this chart was generated using Dragon dictation software. Although every effort was made to ensure the accuracy of this automated transcription, some errors in transcription may have occurred inadvertently. If you may need any clarification, please do not hesitate to contact me through EPIC or at the phone number provided below with my electronic signature. Any pictures or media included in this note were obtained after taking informed verbal consent from the patient and with their approval to include those in the patient's medical record.     Siddhartha Sears MD, MPH  8/14/2019, 10:46 AM  [facility] Infectious Disease   Office: [phone]  Fax: [phone]  Tuesday AM clinic:   [address] [place] [address], [facility] [address]  Thursday AM [id number] [address] [facility]

## 2019-08-15 ENCOUNTER — CARE COORDINATION (OUTPATIENT)
Dept: CASE MANAGEMENT | Age: 79
End: 2019-08-15

## 2019-08-16 ENCOUNTER — CARE COORDINATION (OUTPATIENT)
Dept: CASE MANAGEMENT | Age: 79
End: 2019-08-16

## 2019-08-16 LAB
BLOOD CULTURE, ROUTINE: NORMAL
CULTURE, BLOOD 2: NORMAL
ORGANISM: ABNORMAL
URINE CULTURE, ROUTINE: ABNORMAL

## 2019-08-17 ENCOUNTER — CARE COORDINATION (OUTPATIENT)
Dept: CASE MANAGEMENT | Age: 79
End: 2019-08-17

## 2020-03-25 PROBLEM — J96.01 ACUTE RESPIRATORY FAILURE WITH HYPOXIA (HCC): Status: RESOLVED | Noted: 2020-03-25 | Resolved: 2020-03-24
